# Patient Record
Sex: FEMALE | Race: WHITE | Employment: UNEMPLOYED | URBAN - METROPOLITAN AREA
[De-identification: names, ages, dates, MRNs, and addresses within clinical notes are randomized per-mention and may not be internally consistent; named-entity substitution may affect disease eponyms.]

---

## 2018-08-07 ENCOUNTER — HOSPITAL ENCOUNTER (OUTPATIENT)
Age: 48
Discharge: HOME | End: 2018-08-09
Payer: MEDICAID

## 2018-08-07 ENCOUNTER — HOSPITAL ENCOUNTER (OUTPATIENT)
Dept: GENERAL RADIOLOGY | Age: 48
Discharge: HOME | End: 2018-08-09
Payer: MEDICAID

## 2018-08-07 DIAGNOSIS — K59.00 CONSTIPATION, UNSPECIFIED CONSTIPATION TYPE: ICD-10-CM

## 2018-08-07 PROCEDURE — 74018 RADEX ABDOMEN 1 VIEW: CPT

## 2018-08-15 ENCOUNTER — OFFICE VISIT (OUTPATIENT)
Dept: FAMILY MEDICINE CLINIC | Age: 48
End: 2018-08-15
Payer: MEDICAID

## 2018-08-15 VITALS
DIASTOLIC BLOOD PRESSURE: 72 MMHG | BODY MASS INDEX: 34.25 KG/M2 | SYSTOLIC BLOOD PRESSURE: 124 MMHG | OXYGEN SATURATION: 98 % | HEART RATE: 86 BPM | TEMPERATURE: 99.6 F | WEIGHT: 226 LBS | HEIGHT: 68 IN | RESPIRATION RATE: 16 BRPM

## 2018-08-15 DIAGNOSIS — R42 DIZZINESS: Primary | ICD-10-CM

## 2018-08-15 DIAGNOSIS — H53.8 BLURRED VISION: ICD-10-CM

## 2018-08-15 DIAGNOSIS — R20.2 PARESTHESIA: ICD-10-CM

## 2018-08-15 PROCEDURE — G8417 CALC BMI ABV UP PARAM F/U: HCPCS | Performed by: PHYSICIAN ASSISTANT

## 2018-08-15 PROCEDURE — G8427 DOCREV CUR MEDS BY ELIG CLIN: HCPCS | Performed by: PHYSICIAN ASSISTANT

## 2018-08-15 PROCEDURE — 99213 OFFICE O/P EST LOW 20 MIN: CPT | Performed by: PHYSICIAN ASSISTANT

## 2018-08-15 PROCEDURE — 4004F PT TOBACCO SCREEN RCVD TLK: CPT | Performed by: PHYSICIAN ASSISTANT

## 2018-08-15 NOTE — PROGRESS NOTES
past surgical history that includes Tubal ligation; Tonsillectomy; Cholecystectomy; Tonsillectomy and adenoidectomy; Echo Complete (2013);  section; and Nerve Block (Left, 16). Social History:  reports that she has been smoking Cigarettes. She has a 15.00 pack-year smoking history. She has never used smokeless tobacco. She reports that she does not drink alcohol or use drugs. Family History: family history includes Cancer in her mother, sister, and sister; Heart Disease in her mother; High Blood Pressure in her mother; Other in her brother and mother; Stroke in her mother. Allergies: Penicillins    Physical Exam:  (Vital signs reviewed) /72   Pulse 86   Temp 99.6 °F (37.6 °C) (Oral)   Resp 16   Ht 5' 8\" (1.727 m)   Wt 226 lb (102.5 kg)   LMP 2018   SpO2 98%   BMI 34.36 kg/m²   Oxygen Saturation Interpretation: Normal.   Constitutional:  Alert, development consistent with age. Eyes:  PERRL, EOMI, no discharge or conjunctival injection. Ears:  External ears without lesions. TM's clear without erythema or perforation bilaterally. Throat:  Pharynx without injection, exudate, or tonsillar hypertrophy. Airway patient. Neck:  Normal ROM. Supple. Lungs:  Clear to auscultation and breath sounds equal.  Heart:  Regular rate and rhythm, normal heart sounds, without pathological murmurs, ectopy, gallops, or rubs. Abdomen:  Soft, nontender, good bowel sounds. No firm or pulsatile mass. Back:  No costovertebral tenderness. Skin:  Normal turgor. Warm, dry, without visible rash, unless noted elsewhere. Neurological:  Alert and oriented. Motor functions intact.      ------------------------------------------Test Results Section----------------------------------------------  (All laboratory and radiology results have been personally reviewed by myself)  Laboratory:      Radiology:   All Radiology results interpreted by Radiologist unless otherwise

## 2018-08-24 ENCOUNTER — HOSPITAL ENCOUNTER (OUTPATIENT)
Dept: ULTRASOUND IMAGING | Age: 48
Discharge: HOME | End: 2018-08-24
Payer: MEDICAID

## 2018-08-24 DIAGNOSIS — E03.9 HYPOTHYROIDISM, UNSPECIFIED TYPE: ICD-10-CM

## 2018-08-24 PROCEDURE — 76536 US EXAM OF HEAD AND NECK: CPT

## 2018-09-18 ENCOUNTER — OFFICE VISIT (OUTPATIENT)
Dept: OBGYN | Age: 48
End: 2018-09-18
Payer: MEDICAID

## 2018-09-18 ENCOUNTER — HOSPITAL ENCOUNTER (OUTPATIENT)
Age: 48
Discharge: HOME | End: 2018-09-20
Payer: MEDICAID

## 2018-09-18 VITALS
DIASTOLIC BLOOD PRESSURE: 68 MMHG | BODY MASS INDEX: 33.62 KG/M2 | HEIGHT: 69 IN | TEMPERATURE: 98.4 F | SYSTOLIC BLOOD PRESSURE: 117 MMHG | WEIGHT: 227 LBS | HEART RATE: 73 BPM | RESPIRATION RATE: 16 BRPM

## 2018-09-18 DIAGNOSIS — Z12.4 PAP SMEAR FOR CERVICAL CANCER SCREENING: ICD-10-CM

## 2018-09-18 DIAGNOSIS — Z01.419 ENCNTR FOR GYN EXAM (GENERAL) (ROUTINE) W/O ABN FINDINGS: Primary | ICD-10-CM

## 2018-09-18 DIAGNOSIS — Z87.42 HX OF ABNORMAL CERVICAL PAPANICOLAOU SMEAR: ICD-10-CM

## 2018-09-18 PROCEDURE — G8417 CALC BMI ABV UP PARAM F/U: HCPCS | Performed by: OBSTETRICS & GYNECOLOGY

## 2018-09-18 PROCEDURE — 88175 CYTOPATH C/V AUTO FLUID REDO: CPT

## 2018-09-18 PROCEDURE — 4004F PT TOBACCO SCREEN RCVD TLK: CPT | Performed by: OBSTETRICS & GYNECOLOGY

## 2018-09-18 PROCEDURE — 99386 PREV VISIT NEW AGE 40-64: CPT | Performed by: OBSTETRICS & GYNECOLOGY

## 2018-09-18 PROCEDURE — 99203 OFFICE O/P NEW LOW 30 MIN: CPT | Performed by: OBSTETRICS & GYNECOLOGY

## 2018-09-18 PROCEDURE — G8427 DOCREV CUR MEDS BY ELIG CLIN: HCPCS | Performed by: OBSTETRICS & GYNECOLOGY

## 2018-09-18 PROCEDURE — 87624 HPV HI-RISK TYP POOLED RSLT: CPT

## 2018-09-18 RX ORDER — ERGOCALCIFEROL 1.25 MG/1
CAPSULE ORAL
Refills: 2 | COMMUNITY
Start: 2018-09-04 | End: 2021-05-30

## 2018-09-18 RX ORDER — VENLAFAXINE HYDROCHLORIDE 37.5 MG/1
CAPSULE, EXTENDED RELEASE ORAL
Refills: 2 | COMMUNITY
Start: 2018-09-14 | End: 2018-11-05

## 2018-09-18 RX ORDER — LEVOTHYROXINE SODIUM 0.15 MG/1
TABLET ORAL
Refills: 3 | COMMUNITY
Start: 2018-09-04 | End: 2018-11-14

## 2018-09-20 LAB
CORRESPONDING PAP CASE #: NORMAL
HPV, HIGH RISK: NEGATIVE

## 2018-10-04 ENCOUNTER — OFFICE VISIT (OUTPATIENT)
Dept: OBGYN | Age: 48
End: 2018-10-04
Payer: MEDICAID

## 2018-10-04 DIAGNOSIS — R87.612 LOW GRADE SQUAMOUS INTRAEPITHELIAL LESION ON CYTOLOGIC SMEAR OF CERVIX (LGSIL): ICD-10-CM

## 2018-10-04 DIAGNOSIS — Z87.42 HX OF ABNORMAL CERVICAL PAPANICOLAOU SMEAR: Primary | ICD-10-CM

## 2018-10-04 PROCEDURE — G8484 FLU IMMUNIZE NO ADMIN: HCPCS | Performed by: OBSTETRICS & GYNECOLOGY

## 2018-10-04 PROCEDURE — 99212 OFFICE O/P EST SF 10 MIN: CPT | Performed by: OBSTETRICS & GYNECOLOGY

## 2018-10-04 PROCEDURE — 4004F PT TOBACCO SCREEN RCVD TLK: CPT | Performed by: OBSTETRICS & GYNECOLOGY

## 2018-10-04 PROCEDURE — G8417 CALC BMI ABV UP PARAM F/U: HCPCS | Performed by: OBSTETRICS & GYNECOLOGY

## 2018-10-04 PROCEDURE — G8427 DOCREV CUR MEDS BY ELIG CLIN: HCPCS | Performed by: OBSTETRICS & GYNECOLOGY

## 2018-10-19 ENCOUNTER — PROCEDURE VISIT (OUTPATIENT)
Dept: OBGYN | Age: 48
End: 2018-10-19
Payer: MEDICAID

## 2018-10-19 VITALS
WEIGHT: 226 LBS | RESPIRATION RATE: 14 BRPM | BODY MASS INDEX: 33.47 KG/M2 | DIASTOLIC BLOOD PRESSURE: 86 MMHG | HEART RATE: 78 BPM | SYSTOLIC BLOOD PRESSURE: 130 MMHG | HEIGHT: 69 IN

## 2018-10-19 DIAGNOSIS — R87.612 LOW GRADE SQUAMOUS INTRAEPITHELIAL LESION ON CYTOLOGIC SMEAR OF CERVIX (LGSIL): Primary | ICD-10-CM

## 2018-10-19 PROCEDURE — 99999 PR OFFICE/OUTPT VISIT,PROCEDURE ONLY: CPT | Performed by: OBSTETRICS & GYNECOLOGY

## 2018-10-19 PROCEDURE — 57454 BX/CURETT OF CERVIX W/SCOPE: CPT | Performed by: OBSTETRICS & GYNECOLOGY

## 2018-10-19 PROCEDURE — 99214 OFFICE O/P EST MOD 30 MIN: CPT | Performed by: OBSTETRICS & GYNECOLOGY

## 2018-10-22 ENCOUNTER — HOSPITAL ENCOUNTER (OUTPATIENT)
Age: 48
Discharge: HOME | End: 2018-10-24
Payer: MEDICAID

## 2018-10-22 PROCEDURE — 88305 TISSUE EXAM BY PATHOLOGIST: CPT

## 2018-10-26 ENCOUNTER — OFFICE VISIT (OUTPATIENT)
Dept: OBGYN | Age: 48
End: 2018-10-26
Payer: MEDICAID

## 2018-10-26 VITALS
TEMPERATURE: 98.8 F | HEART RATE: 70 BPM | HEIGHT: 69 IN | DIASTOLIC BLOOD PRESSURE: 79 MMHG | BODY MASS INDEX: 33.92 KG/M2 | RESPIRATION RATE: 16 BRPM | WEIGHT: 229 LBS | SYSTOLIC BLOOD PRESSURE: 136 MMHG

## 2018-10-26 DIAGNOSIS — Z98.890 POST-OPERATIVE STATE: Primary | ICD-10-CM

## 2018-10-26 PROCEDURE — 99024 POSTOP FOLLOW-UP VISIT: CPT | Performed by: OBSTETRICS & GYNECOLOGY

## 2018-10-26 PROCEDURE — 99212 OFFICE O/P EST SF 10 MIN: CPT | Performed by: OBSTETRICS & GYNECOLOGY

## 2018-10-31 ENCOUNTER — TELEPHONE (OUTPATIENT)
Dept: ADMINISTRATIVE | Age: 48
End: 2018-10-31

## 2018-10-31 ENCOUNTER — TELEPHONE (OUTPATIENT)
Dept: OBGYN | Age: 48
End: 2018-10-31

## 2018-11-05 ENCOUNTER — HOSPITAL ENCOUNTER (OUTPATIENT)
Age: 48
Discharge: HOME | End: 2018-11-07
Payer: MEDICAID

## 2018-11-05 ENCOUNTER — OFFICE VISIT (OUTPATIENT)
Dept: FAMILY MEDICINE CLINIC | Age: 48
End: 2018-11-05
Payer: MEDICAID

## 2018-11-05 VITALS
TEMPERATURE: 98.7 F | HEART RATE: 85 BPM | DIASTOLIC BLOOD PRESSURE: 66 MMHG | OXYGEN SATURATION: 98 % | WEIGHT: 224 LBS | HEIGHT: 69 IN | SYSTOLIC BLOOD PRESSURE: 118 MMHG | RESPIRATION RATE: 20 BRPM | BODY MASS INDEX: 33.18 KG/M2

## 2018-11-05 DIAGNOSIS — E66.09 CLASS 1 OBESITY DUE TO EXCESS CALORIES WITH BODY MASS INDEX (BMI) OF 33.0 TO 33.9 IN ADULT, UNSPECIFIED WHETHER SERIOUS COMORBIDITY PRESENT: ICD-10-CM

## 2018-11-05 DIAGNOSIS — M47.816 LUMBAR FACET ARTHROPATHY: Chronic | ICD-10-CM

## 2018-11-05 DIAGNOSIS — E03.9 HYPOTHYROIDISM, UNSPECIFIED TYPE: ICD-10-CM

## 2018-11-05 DIAGNOSIS — R53.83 FATIGUE, UNSPECIFIED TYPE: ICD-10-CM

## 2018-11-05 DIAGNOSIS — Z76.89 ENCOUNTER TO ESTABLISH CARE: Primary | ICD-10-CM

## 2018-11-05 DIAGNOSIS — Z72.0 TOBACCO ABUSE: ICD-10-CM

## 2018-11-05 DIAGNOSIS — Z11.4 SCREENING FOR HIV (HUMAN IMMUNODEFICIENCY VIRUS): ICD-10-CM

## 2018-11-05 DIAGNOSIS — R93.89 ABNORMAL THYROID ULTRASOUND: ICD-10-CM

## 2018-11-05 PROBLEM — E66.811 CLASS 1 OBESITY DUE TO EXCESS CALORIES WITH BODY MASS INDEX (BMI) OF 33.0 TO 33.9 IN ADULT: Status: ACTIVE | Noted: 2018-11-05

## 2018-11-05 LAB
ALBUMIN SERPL-MCNC: 3.9 G/DL (ref 3.5–5.2)
ALP BLD-CCNC: 74 U/L (ref 35–104)
ALT SERPL-CCNC: 25 U/L (ref 0–32)
ANION GAP SERPL CALCULATED.3IONS-SCNC: 14 MMOL/L (ref 7–16)
AST SERPL-CCNC: 18 U/L (ref 0–31)
BILIRUB SERPL-MCNC: <0.2 MG/DL (ref 0–1.2)
BUN BLDV-MCNC: 7 MG/DL (ref 6–20)
CALCIUM SERPL-MCNC: 9.3 MG/DL (ref 8.6–10.2)
CHLORIDE BLD-SCNC: 103 MMOL/L (ref 98–107)
CHOLESTEROL, TOTAL: 175 MG/DL (ref 0–199)
CO2: 22 MMOL/L (ref 22–29)
CREAT SERPL-MCNC: 0.6 MG/DL (ref 0.5–1)
GFR AFRICAN AMERICAN: >60
GFR NON-AFRICAN AMERICAN: >60 ML/MIN/1.73
GLUCOSE BLD-MCNC: 94 MG/DL (ref 74–99)
HCT VFR BLD CALC: 44.6 % (ref 34–48)
HDLC SERPL-MCNC: 44 MG/DL
HEMOGLOBIN: 14.4 G/DL (ref 11.5–15.5)
LDL CHOLESTEROL CALCULATED: 109 MG/DL (ref 0–99)
MCH RBC QN AUTO: 29.8 PG (ref 26–35)
MCHC RBC AUTO-ENTMCNC: 32.3 % (ref 32–34.5)
MCV RBC AUTO: 92.1 FL (ref 80–99.9)
PDW BLD-RTO: 12.8 FL (ref 11.5–15)
PLATELET # BLD: 380 E9/L (ref 130–450)
PMV BLD AUTO: 10.1 FL (ref 7–12)
POTASSIUM SERPL-SCNC: 4 MMOL/L (ref 3.5–5)
RBC # BLD: 4.84 E12/L (ref 3.5–5.5)
SODIUM BLD-SCNC: 139 MMOL/L (ref 132–146)
TOTAL PROTEIN: 7.6 G/DL (ref 6.4–8.3)
TRIGL SERPL-MCNC: 109 MG/DL (ref 0–149)
TSH SERPL DL<=0.05 MIU/L-ACNC: 5.23 UIU/ML (ref 0.27–4.2)
VITAMIN D 25-HYDROXY: 21 NG/ML (ref 30–100)
VLDLC SERPL CALC-MCNC: 22 MG/DL
WBC # BLD: 13.5 E9/L (ref 4.5–11.5)

## 2018-11-05 PROCEDURE — 80053 COMPREHEN METABOLIC PANEL: CPT

## 2018-11-05 PROCEDURE — 80061 LIPID PANEL: CPT

## 2018-11-05 PROCEDURE — 82306 VITAMIN D 25 HYDROXY: CPT

## 2018-11-05 PROCEDURE — G8484 FLU IMMUNIZE NO ADMIN: HCPCS | Performed by: FAMILY MEDICINE

## 2018-11-05 PROCEDURE — G8427 DOCREV CUR MEDS BY ELIG CLIN: HCPCS | Performed by: FAMILY MEDICINE

## 2018-11-05 PROCEDURE — 4004F PT TOBACCO SCREEN RCVD TLK: CPT | Performed by: FAMILY MEDICINE

## 2018-11-05 PROCEDURE — 86703 HIV-1/HIV-2 1 RESULT ANTBDY: CPT

## 2018-11-05 PROCEDURE — G8417 CALC BMI ABV UP PARAM F/U: HCPCS | Performed by: FAMILY MEDICINE

## 2018-11-05 PROCEDURE — 99203 OFFICE O/P NEW LOW 30 MIN: CPT | Performed by: FAMILY MEDICINE

## 2018-11-05 PROCEDURE — 36415 COLL VENOUS BLD VENIPUNCTURE: CPT

## 2018-11-05 PROCEDURE — 86376 MICROSOMAL ANTIBODY EACH: CPT

## 2018-11-05 PROCEDURE — 84443 ASSAY THYROID STIM HORMONE: CPT

## 2018-11-05 PROCEDURE — 90732 PPSV23 VACC 2 YRS+ SUBQ/IM: CPT | Performed by: FAMILY MEDICINE

## 2018-11-05 PROCEDURE — 90471 IMMUNIZATION ADMIN: CPT | Performed by: FAMILY MEDICINE

## 2018-11-05 PROCEDURE — 85027 COMPLETE CBC AUTOMATED: CPT

## 2018-11-05 RX ORDER — PANTOPRAZOLE SODIUM 40 MG/1
TABLET, DELAYED RELEASE ORAL
COMMUNITY
Start: 2018-10-26 | End: 2018-11-05

## 2018-11-05 RX ORDER — MONTELUKAST SODIUM 10 MG/1
10 TABLET ORAL NIGHTLY
COMMUNITY
Start: 2018-10-26

## 2018-11-05 ASSESSMENT — PATIENT HEALTH QUESTIONNAIRE - PHQ9
1. LITTLE INTEREST OR PLEASURE IN DOING THINGS: 1
SUM OF ALL RESPONSES TO PHQ9 QUESTIONS 1 & 2: 2
SUM OF ALL RESPONSES TO PHQ QUESTIONS 1-9: 2
2. FEELING DOWN, DEPRESSED OR HOPELESS: 1
SUM OF ALL RESPONSES TO PHQ QUESTIONS 1-9: 2

## 2018-11-05 ASSESSMENT — ENCOUNTER SYMPTOMS
BACK PAIN: 0
DIARRHEA: 0
RHINORRHEA: 0
COUGH: 0
SINUS PAIN: 0
CONSTIPATION: 0
ABDOMINAL PAIN: 0
NAUSEA: 0
VOMITING: 0
SORE THROAT: 0
SHORTNESS OF BREATH: 0

## 2018-11-05 NOTE — PROGRESS NOTES
acid dehydrogenase (LDH)     Other abnormal blood chemistry     Overactive bladder     Pneumonia        Past Surgical History:   Procedure Laterality Date     SECTION      CHOLECYSTECTOMY      ECHO COMPLETE  2013         LAPAROSCOPY      NERVE BLOCK Left 16    lumbar left medial branch block #1    TONSILLECTOMY      TONSILLECTOMY AND ADENOIDECTOMY      TUBAL LIGATION         Social History     Social History    Marital status:      Spouse name: N/A    Number of children: N/A    Years of education: N/A     Occupational History    home health aide and       Social History Main Topics    Smoking status: Current Every Day Smoker     Packs/day: 0.50     Years: 30.00     Types: Cigarettes    Smokeless tobacco: Never Used    Alcohol use No    Drug use: No    Sexual activity: Not Currently     Partners: Male     Other Topics Concern    Not on file     Social History Narrative    No narrative on file        Family History   Problem Relation Age of Onset    Cancer Mother     Stroke Mother     High Blood Pressure Mother     Other Mother     Heart Disease Mother     Cancer Sister     Other Brother     Cancer Sister        Vitals:    18 1056   BP: 118/66   Site: Right Upper Arm   Position: Sitting   Cuff Size: Large Adult   Pulse: 85   Resp: 20   Temp: 98.7 °F (37.1 °C)   TempSrc: Oral   SpO2: 98%   Weight: 224 lb (101.6 kg)   Height: 5' 9\" (1.753 m)     Estimated body mass index is 33.08 kg/m² as calculated from the following:    Height as of this encounter: 5' 9\" (1.753 m). Weight as of this encounter: 224 lb (101.6 kg). Physical Exam   Constitutional: She is oriented to person, place, and time. She appears well-developed and well-nourished. No distress. HENT:   Head: Normocephalic and atraumatic. Eyes: EOM are normal.   Neck: Normal range of motion. Cardiovascular: Normal rate and regular rhythm. No murmur heard.   Pulmonary/Chest: Effort normal

## 2018-11-05 NOTE — PATIENT INSTRUCTIONS
the shot. Some people should not get this vaccine  · Anyone who has had a life-threatening allergic reaction to PPSV should not get another dose. · Anyone who has a severe allergy to any component of PPSV should not receive it. Tell your provider if you have any severe allergies. · Anyone who is moderately or severely ill when the shot is scheduled may be asked to wait until they recover before getting the vaccine. Someone with a mild illness can usually be vaccinated. · Children less than 3years of age should not receive this vaccine. · There is no evidence that PPSV is harmful to either a pregnant woman or to her fetus. However, as a precaution, women who need the vaccine should be vaccinated before becoming pregnant, if possible. Risks of a vaccine reaction  With any medicine, including vaccines, there is a chance of side effects. These are usually mild and go away on their own, but serious reactions are also possible. About half of people who get PPSV have mild side effects, such as redness or pain where the shot is given, which go away within about two days. Less than 1 out of 100 people develop a fever, muscle aches, or more severe local reactions. Problems that could happen after any vaccine:  · People sometimes faint after a medical procedure, including vaccination. Sitting or lying down for about 15 minutes can help prevent fainting, and injuries caused by a fall. Tell your doctor if you feel dizzy, or have vision changes or ringing in the ears. · Some people get severe pain in the shoulder and have difficulty moving the arm where a shot was given. This happens very rarely. · Any medication can cause a severe allergic reaction. Such reactions from a vaccine are very rare, estimated at about 1 in a million doses, and would happen within a few minutes to a few hours after the vaccination. As with any medicine, there is a very remote chance of a vaccine causing a serious injury or death.   The information.

## 2018-11-07 LAB
HIV-1 AND HIV-2 ANTIBODIES: NORMAL
THYROID PEROXIDASE (TPO) ABS: 475.8 IU/ML (ref 0–9)

## 2018-11-14 ENCOUNTER — OFFICE VISIT (OUTPATIENT)
Dept: ENDOCRINOLOGY | Age: 48
End: 2018-11-14
Payer: MEDICAID

## 2018-11-14 VITALS
HEIGHT: 69 IN | HEART RATE: 76 BPM | OXYGEN SATURATION: 97 % | WEIGHT: 226.6 LBS | BODY MASS INDEX: 33.56 KG/M2 | RESPIRATION RATE: 16 BRPM | SYSTOLIC BLOOD PRESSURE: 126 MMHG | DIASTOLIC BLOOD PRESSURE: 76 MMHG

## 2018-11-14 DIAGNOSIS — E55.9 VITAMIN D DEFICIENCY: ICD-10-CM

## 2018-11-14 DIAGNOSIS — E53.8 B12 DEFICIENCY: ICD-10-CM

## 2018-11-14 DIAGNOSIS — E03.9 HYPOTHYROIDISM, UNSPECIFIED TYPE: Primary | ICD-10-CM

## 2018-11-14 PROCEDURE — 99204 OFFICE O/P NEW MOD 45 MIN: CPT | Performed by: INTERNAL MEDICINE

## 2018-11-14 RX ORDER — LEVOTHYROXINE SODIUM 0.15 MG/1
TABLET ORAL
Qty: 90 TABLET | Refills: 5 | Status: SHIPPED
Start: 2018-11-14 | End: 2021-05-30 | Stop reason: SDUPTHER

## 2018-11-14 NOTE — LETTER
ALLERGIES AND DRUG REACTIONS   Allergies   Allergen Reactions    Penicillins        CURRENT MEDICATIONS     Current Outpatient Prescriptions   Medication Sig Dispense Refill    levothyroxine (SYNTHROID) 150 MCG tablet Take 1 tablet 6 days a week and 1 and 1/2 tab on Sunday 90 tablet 5    montelukast (SINGULAIR) 10 MG tablet       vitamin D (ERGOCALCIFEROL) 04222 units CAPS capsule TAKE 1 CAPSULE BY ORAL ROUTE EVERY WEEK  2    Loratadine 10 MG CAPS Take 10 mg by mouth daily as needed. No current facility-administered medications for this visit. Review of Systems  Constitutional: No fever, no chills, no diaphoresis, no generalized weakness. HEENT: No blurred vision, No sore throat, no ear pain, no hair loss  Neck: denied any neck swelling, difficulty swallowing,   Cadrdiopulomary: No CP, SOB or palpitation, No orthopnea or PND. No cough or wheezing. GI: No N/V/D, no constipation, No abdominal pain, no melena or hematochezia   : Denied any dysuria, hematuria, flank pain, discharge, or incontinence. Skin: denied any rash, ulcer, Hirsute, or hyperpigmentation. MSK: denied any joint deformity, joint pain/swelling, muscle pain, or back pain. Neuro: no numbess, no tingling, no weakness,     OBJECTIVE    /76   Pulse 76   Resp 16   Ht 5' 9\" (1.753 m)   Wt 226 lb 9.6 oz (102.8 kg)   LMP 10/02/2018 (Exact Date)   SpO2 97%   BMI 33.46 kg/m²    BP Readings from Last 4 Encounters:   11/14/18 126/76   11/05/18 118/66   10/26/18 136/79   10/19/18 130/86     Wt Readings from Last 6 Encounters:   11/14/18 226 lb 9.6 oz (102.8 kg)   11/05/18 224 lb (101.6 kg)   10/26/18 229 lb (103.9 kg)   10/19/18 226 lb (102.5 kg)   09/18/18 227 lb (103 kg)   08/15/18 226 lb (102.5 kg)       Physical examination:  General: awake alert, oriented x3, no abnormal position or movements. HEENT: normocephalic non traumatic  Neck: supple, no LN enlargement, no thyromegaly, no thyroid tenderness, no JVD.

## 2018-11-14 NOTE — PROGRESS NOTES
transaminase or lactic acid dehydrogenase (LDH)     Other abnormal blood chemistry     Overactive bladder     Pneumonia      PAST SURGICAL HISTORY   Past Surgical History:   Procedure Laterality Date     SECTION      CHOLECYSTECTOMY      ECHO COMPLETE  2013         LAPAROSCOPY      NERVE BLOCK Left 16    lumbar left medial branch block #1    TONSILLECTOMY      TONSILLECTOMY AND ADENOIDECTOMY      TUBAL LIGATION       SOCIAL HISTORY   Social History     Social History    Marital status:      Spouse name: N/A    Number of children: N/A    Years of education: N/A     Occupational History    home health aide and       Social History Main Topics    Smoking status: Current Every Day Smoker     Packs/day: 0.50     Years: 30.00     Types: Cigarettes    Smokeless tobacco: Never Used    Alcohol use No    Drug use: No    Sexual activity: Not Currently     Partners: Male     Other Topics Concern    Not on file     Social History Narrative    No narrative on file     FAMILY HISTORY   Family History   Problem Relation Age of Onset    Cancer Mother     Stroke Mother     High Blood Pressure Mother     Other Mother     Heart Disease Mother     Cancer Sister     Other Brother     Cancer Sister      ALLERGIES AND DRUG REACTIONS   Allergies   Allergen Reactions    Penicillins        CURRENT MEDICATIONS     Current Outpatient Prescriptions   Medication Sig Dispense Refill    levothyroxine (SYNTHROID) 150 MCG tablet Take 1 tablet 6 days a week and 1 and 1/2 tab on  90 tablet 5    montelukast (SINGULAIR) 10 MG tablet       vitamin D (ERGOCALCIFEROL) 07211 units CAPS capsule TAKE 1 CAPSULE BY ORAL ROUTE EVERY WEEK  2    Loratadine 10 MG CAPS Take 10 mg by mouth daily as needed. No current facility-administered medications for this visit. Review of Systems  Constitutional: No fever, no chills, no diaphoresis, no generalized weakness.   HEENT: No blurred

## 2018-11-18 PROBLEM — E55.9 VITAMIN D DEFICIENCY: Status: ACTIVE | Noted: 2018-11-18

## 2019-04-26 ENCOUNTER — TELEPHONE (OUTPATIENT)
Dept: OBGYN | Age: 49
End: 2019-04-26

## 2019-04-26 NOTE — TELEPHONE ENCOUNTER
Called patient regarding her missed appointment today. The number listed in the chart is the wrong number.     -Bharath, 4/26/19

## 2021-05-30 ENCOUNTER — HOSPITAL ENCOUNTER (EMERGENCY)
Age: 51
Discharge: HOME | End: 2021-05-30
Attending: EMERGENCY MEDICINE
Payer: MEDICAID

## 2021-05-30 VITALS
RESPIRATION RATE: 20 BRPM | OXYGEN SATURATION: 99 % | HEART RATE: 74 BPM | TEMPERATURE: 97.5 F | DIASTOLIC BLOOD PRESSURE: 73 MMHG | SYSTOLIC BLOOD PRESSURE: 119 MMHG | WEIGHT: 242 LBS | BODY MASS INDEX: 35.74 KG/M2

## 2021-05-30 DIAGNOSIS — N93.9 VAGINAL BLEEDING: Primary | ICD-10-CM

## 2021-05-30 DIAGNOSIS — F31.9 BIPOLAR 1 DISORDER (HCC): ICD-10-CM

## 2021-05-30 DIAGNOSIS — E03.9 HYPOTHYROIDISM, UNSPECIFIED TYPE: ICD-10-CM

## 2021-05-30 LAB
ANION GAP SERPL CALCULATED.3IONS-SCNC: 10 MMOL/L (ref 7–16)
BASOPHILS ABSOLUTE: 0.06 E9/L (ref 0–0.2)
BASOPHILS RELATIVE PERCENT: 0.6 % (ref 0–2)
BUN BLDV-MCNC: 11 MG/DL (ref 6–20)
CALCIUM SERPL-MCNC: 9.1 MG/DL (ref 8.6–10.2)
CHLORIDE BLD-SCNC: 104 MMOL/L (ref 98–107)
CLUE CELLS: NORMAL
CO2: 24 MMOL/L (ref 22–29)
CREAT SERPL-MCNC: 0.6 MG/DL (ref 0.5–1)
EOSINOPHILS ABSOLUTE: 0.2 E9/L (ref 0.05–0.5)
EOSINOPHILS RELATIVE PERCENT: 1.9 % (ref 0–6)
GFR AFRICAN AMERICAN: >60
GFR NON-AFRICAN AMERICAN: >60 ML/MIN/1.73
GLUCOSE BLD-MCNC: 121 MG/DL (ref 74–99)
HCT VFR BLD CALC: 39.9 % (ref 34–48)
HEMOGLOBIN: 13.3 G/DL (ref 11.5–15.5)
IMMATURE GRANULOCYTES #: 0.04 E9/L
IMMATURE GRANULOCYTES %: 0.4 % (ref 0–5)
LYMPHOCYTES ABSOLUTE: 2.4 E9/L (ref 1.5–4)
LYMPHOCYTES RELATIVE PERCENT: 22.6 % (ref 20–42)
MCH RBC QN AUTO: 31.1 PG (ref 26–35)
MCHC RBC AUTO-ENTMCNC: 33.3 % (ref 32–34.5)
MCV RBC AUTO: 93.2 FL (ref 80–99.9)
MONOCYTES ABSOLUTE: 0.73 E9/L (ref 0.1–0.95)
MONOCYTES RELATIVE PERCENT: 6.9 % (ref 2–12)
NEUTROPHILS ABSOLUTE: 7.2 E9/L (ref 1.8–7.3)
NEUTROPHILS RELATIVE PERCENT: 67.6 % (ref 43–80)
PDW BLD-RTO: 12.7 FL (ref 11.5–15)
PLATELET # BLD: 307 E9/L (ref 130–450)
PMV BLD AUTO: 9.9 FL (ref 7–12)
POTASSIUM SERPL-SCNC: 4.1 MMOL/L (ref 3.5–5)
RBC # BLD: 4.28 E12/L (ref 3.5–5.5)
SODIUM BLD-SCNC: 138 MMOL/L (ref 132–146)
SOURCE WET PREP: NORMAL
TRICHOMONAS PREP: NORMAL
TSH SERPL DL<=0.05 MIU/L-ACNC: 18.06 UIU/ML (ref 0.27–4.2)
WBC # BLD: 10.6 E9/L (ref 4.5–11.5)
YEAST WET PREP: NORMAL

## 2021-05-30 PROCEDURE — 80048 BASIC METABOLIC PNL TOTAL CA: CPT

## 2021-05-30 PROCEDURE — 84443 ASSAY THYROID STIM HORMONE: CPT

## 2021-05-30 PROCEDURE — 87591 N.GONORRHOEAE DNA AMP PROB: CPT

## 2021-05-30 PROCEDURE — 87210 SMEAR WET MOUNT SALINE/INK: CPT

## 2021-05-30 PROCEDURE — 87491 CHLMYD TRACH DNA AMP PROBE: CPT

## 2021-05-30 PROCEDURE — 99283 EMERGENCY DEPT VISIT LOW MDM: CPT

## 2021-05-30 PROCEDURE — 36415 COLL VENOUS BLD VENIPUNCTURE: CPT

## 2021-05-30 PROCEDURE — 85025 COMPLETE CBC W/AUTO DIFF WBC: CPT

## 2021-05-30 RX ORDER — ARIPIPRAZOLE 2 MG/1
2 TABLET ORAL DAILY
COMMUNITY
End: 2022-06-14 | Stop reason: ALTCHOICE

## 2021-05-30 RX ORDER — LEVOTHYROXINE SODIUM 0.07 MG/1
TABLET ORAL
Qty: 14 TABLET | Refills: 0 | Status: SHIPPED | OUTPATIENT
Start: 2021-05-30 | End: 2022-05-04

## 2021-05-30 RX ORDER — ARIPIPRAZOLE 2 MG/1
2 TABLET ORAL DAILY
Qty: 14 TABLET | Refills: 0 | Status: SHIPPED | OUTPATIENT
Start: 2021-05-30

## 2021-05-30 RX ORDER — LEVOTHYROXINE SODIUM 0.07 MG/1
75 TABLET ORAL DAILY
Qty: 14 TABLET | Refills: 0 | Status: SHIPPED | OUTPATIENT
Start: 2021-05-30 | End: 2022-05-04

## 2021-05-30 ASSESSMENT — ENCOUNTER SYMPTOMS
NAUSEA: 0
ABDOMINAL PAIN: 0
SHORTNESS OF BREATH: 0
VOMITING: 0

## 2021-05-30 NOTE — ED NOTES
Discharge instructions and Rx reviewed, patient verbalized understanding.      Brian Godinez RN  05/30/21 [id number]

## 2021-05-30 NOTE — ED PROVIDER NOTES
Patient presents with 3 days of \"heavy\" vaginal bleeding that seems different than her typical menstrual cycle. She is unsure of the timing of her most recent period. She states she is dizzy but was feeling this way prior to her menstrual cycle. She was recently started on Abilify and levothyroxine. She is concerned these symptoms are a side effect of the medications. She denies CP, SOB or lightheadedness. The history is provided by the patient. Female  Problem  Pain severity:  No pain  Recent urinary tract infections: no    Relieved by:  None tried  Worsened by:  Nothing  Ineffective treatments:  None tried  Associated symptoms: no abdominal pain, no fever, no flank pain, no nausea and no vomiting    Risk factors: not pregnant    Risk factors comment:  History of cysts and abnormal pap smears    Patient is accompanied by her cousin, who privately pulled me aside. She states the patient was recently in [place] [address] and [facility]. She was previously \"pink-slipped\" for her psychosis. She is paranoid schizophrenic and bipolar. Her cousin states she wasn't sleeping and thought the dog's ID microchip was a way for people to monitor her and control her thoughts. The patient had confided in her cousin that she felt like her mind was in a fog. Review of Systems   Constitutional: Positive for activity change and fatigue. Negative for appetite change, chills, diaphoresis and fever. HENT: Negative. Respiratory: Negative for shortness of breath. Cardiovascular: Negative for chest pain. Gastrointestinal: Negative for abdominal pain, nausea and vomiting. Genitourinary: Positive for menstrual problem and vaginal bleeding. Negative for dysuria and flank pain. Musculoskeletal: Negative. Skin: Negative. Neurological: Positive for dizziness. Negative for syncope, weakness, light-headedness and headaches. Psychiatric/Behavioral: Positive for decreased concentration and sleep disturbance.  The patient is nervous/anxious. Physical Exam  Vitals and nursing note reviewed. Constitutional:       General: She is not in acute distress. Appearance: Normal appearance. She is well-developed. She is obese. She is not ill-appearing or toxic-appearing. HENT:      Head: Normocephalic and atraumatic. Nose: Nose normal.      Mouth/Throat:      Mouth: Mucous membranes are moist.      Pharynx: Oropharynx is clear. Eyes:      Conjunctiva/sclera: Conjunctivae normal.      Pupils: Pupils are equal, round, and reactive to light. Cardiovascular:      Rate and Rhythm: Normal rate and regular rhythm. Pulses: Normal pulses. Heart sounds: Normal heart sounds. No murmur heard. Pulmonary:      Effort: Pulmonary effort is normal. No respiratory distress. Breath sounds: Normal breath sounds. No wheezing or rales. Abdominal:      General: Bowel sounds are normal.      Palpations: Abdomen is soft. Tenderness: There is no abdominal tenderness. There is no guarding or rebound. Genitourinary:     General: Normal vulva. Vagina: No vaginal discharge. Comments: Small amount of bright red blood in the vaginal vault. Visible irregularity appreciated of the cervix. No masses appreciated. Musculoskeletal:      Cervical back: Normal range of motion and neck supple. Skin:     General: Skin is warm and dry. Capillary Refill: Capillary refill takes less than 2 seconds. Coloration: Skin is not pale. Neurological:      General: No focal deficit present. Mental Status: She is alert and oriented to person, place, and time. Mental status is at baseline. Cranial Nerves: No cranial nerve deficit. Sensory: No sensory deficit. Motor: No weakness.       Coordination: Coordination normal.   Psychiatric:         Mood and Affect: Mood normal.         Behavior: Behavior normal.         Procedures    [facility]            --------------------------------------------- PAST HISTORY ---------------------------------------------  Past Medical History:  has a past medical history of Abnormal Pap smear of cervix, ALT (SGPT) level raised, Anemia, Anxiety, Arthritis, Bronchitis, Complication of anesthesia, Depressive disorder, not elsewhere classified, Dietary surveillance and counseling, History of conization of cervix, Hx of conization of cervix complicating pregnancy, Hyperlipidemia, Hypothyroidism, Leukocytosis, unspecified, Nonspecific elevation of levels of transaminase or lactic acid dehydrogenase (LDH), Other abnormal blood chemistry, Overactive bladder, and Pneumonia. Past Surgical History:  has a past surgical history that includes Tubal ligation; Tonsillectomy; Cholecystectomy; Tonsillectomy and adenoidectomy; Echo Complete (2013);  section; Nerve Block (Left, 16); and laparoscopy. Social History:  reports that she has quit smoking. Her smoking use included cigarettes. She has a 15.00 pack-year smoking history. She has never used smokeless tobacco. She reports that she does not drink alcohol and does not use drugs. Family History: family history includes Cancer in her mother, sister, and sister; Heart Disease in her mother; High Blood Pressure in her mother; Other in her brother and mother; Stroke in her mother. The patients home medications have been reviewed.     Allergies: Penicillins    -------------------------------------------------- RESULTS -------------------------------------------------  Labs:  Results for orders placed or performed during the hospital encounter of 21   Wet prep, genital    Specimen: Vaginal   Result Value Ref Range    Trichomonas Prep None Seen     Yeast, Wet Prep None Seen     Clue Cells, Wet Prep None Seen     Source Wet Prep VAGINAL    C.trachomatis N.gonorrhoeae DNA    Specimen: Cervix   Result Value Ref Range    Source Vagina    CBC Auto Differential   Result Value Ref Range    WBC 10.6 4.5 - 11.5 E9/L    RBC 4.28 3.50 - 5.50 E12/L    Hemoglobin 13.3 11.5 - 15.5 g/dL    Hematocrit 39.9 34.0 - 48.0 %    MCV 93.2 80.0 - 99.9 fL    MCH 31.1 26.0 - 35.0 pg    MCHC 33.3 32.0 - 34.5 %    RDW 12.7 11.5 - 15.0 fL    Platelets 425 622 - 387 E9/L    MPV 9.9 7.0 - 12.0 fL    Neutrophils % 67.6 43.0 - 80.0 %    Immature Granulocytes % 0.4 0.0 - 5.0 %    Lymphocytes % 22.6 20.0 - 42.0 %    Monocytes % 6.9 2.0 - 12.0 %    Eosinophils % 1.9 0.0 - 6.0 %    Basophils % 0.6 0.0 - 2.0 %    Neutrophils Absolute 7.20 1.80 - 7.30 E9/L    Immature Granulocytes # 0.04 E9/L    Lymphocytes Absolute 2.40 1.50 - 4.00 E9/L    Monocytes Absolute 0.73 0.10 - 0.95 E9/L    Eosinophils Absolute 0.20 0.05 - 0.50 E9/L    Basophils Absolute 0.06 0.00 - 0.20 N3/V   Basic Metabolic Panel   Result Value Ref Range    Sodium 138 132 - 146 mmol/L    Potassium 4.1 3.5 - 5.0 mmol/L    Chloride 104 98 - 107 mmol/L    CO2 24 22 - 29 mmol/L    Anion Gap 10 7 - 16 mmol/L    Glucose 121 (H) 74 - 99 mg/dL    BUN 11 6 - 20 mg/dL    CREATININE 0.6 0.5 - 1.0 mg/dL    GFR Non-African American >60 >=60 mL/min/1.73    GFR African American >60     Calcium 9.1 8.6 - 10.2 mg/dL   TSH without Reflex   Result Value Ref Range    TSH 18.060 (H) 0.270 - 4.200 uIU/mL       Radiology:  No orders to display       ------------------------- NURSING NOTES AND VITALS REVIEWED ---------------------------  Date / Time Roomed:  5/30/2021  7:15 AM  ED Bed Assignment:  16/16    The nursing notes within the ED encounter and vital signs as below have been reviewed.    /73   Pulse 74   Temp 97.5 °F (36.4 °C)   Resp 20   Wt 242 lb (109.8 kg)   LMP 05/28/2021   SpO2 99%   BMI 35.74 kg/m²   Oxygen Saturation Interpretation: Normal      ------------------------------------------ PROGRESS NOTES ------------------------------------------  I have spoken with the patient and cousin and discussed todays results, in addition to providing specific details for the plan of care and counseling regarding the diagnosis and prognosis. Their questions are answered at this time and they are agreeable with the plan. I discussed at length with them reasons for immediate return here for re evaluation. They will followup with primary care by calling their office tomorrow. I have strongly encouraged the patient to continue her psychiatric medications and to make a follow up apt with both psychiatry and gynecology as she needs a pap smear. I have increased her levothyroxine to 75mcg from 50mcg as she is still quite high on TSH. I have advised her to make an apt with a PCP for further monitoring.   --------------------------------- ADDITIONAL PROVIDER NOTES ---------------------------------  At this time the patient is without objective evidence of an acute process requiring hospitalization or inpatient management. They have remained hemodynamically stable throughout their entire ED visit and are stable for discharge with outpatient follow-up. The plan has been discussed in detail and they are aware of the specific conditions for emergent return, as well as the importance of follow-up. Discharge Medication List as of 5/30/2021  8:50 AM      START taking these medications    Details   !! ARIPiprazole (ABILIFY) 2 MG tablet Take 1 tablet by mouth daily, Disp-14 tablet, R-0Print      !! levothyroxine (SYNTHROID) 75 MCG tablet Take 1 tablet by mouth Daily, Disp-14 tablet, R-0Print       !! - Potential duplicate medications found. Please discuss with provider. Diagnosis:  1. Vaginal bleeding    2. Hypothyroidism, unspecified type    3. Bipolar 1 disorder ([facility]        Disposition:  Patient's disposition: Discharge to home  Patient's condition is stable.          Brittney Wesley DO  05/30/21 1003

## 2021-06-03 LAB
C TRACH DNA GENITAL QL NAA+PROBE: NEGATIVE
N. GONORRHOEAE DNA: NEGATIVE
SOURCE: NORMAL

## 2021-08-05 ENCOUNTER — TELEPHONE (OUTPATIENT)
Dept: FAMILY MEDICINE CLINIC | Age: 51
End: 2021-08-05

## 2021-08-05 NOTE — TELEPHONE ENCOUNTER
Misty/patient's cousin called back and informed patient has moved back in the area and needs to re-establish. Blanca Franks asked to schedule patient for a Physical appt and stated patient is also trying to get on disability. Informed next available for a Physical is 12/7/21, which would be more than 3 yrs since patient was last seen, making her a new patient. Blanca Franks is asking if patient can be seen any sooner as she is also on medication for mental health and trying to get back on track with a PCP. Informed that I will send msg to Dr. Vandana Lopes and will call once I am advised.      Last seen 11/5/2018  Next appt Visit date not found

## 2021-08-05 NOTE — TELEPHONE ENCOUNTER
----- Message from Ingris Cee sent at 8/4/2021 12:38 PM EDT -----  Subject: Appointment Request    Reason for Call: Routine Physical Exam    QUESTIONS  Type of Appointment? New Patient/New to Provider  Reason for appointment request? No appointments available during search  Additional Information for Provider? Patient would like to know if they   can be seen august 30th in the afternoon if so please give a call  ---------------------------------------------------------------------------  --------------  EZBOB  What is the best way for the office to contact you? OK to leave message on   voicemail  Preferred Call Back Phone Number? [phone]  ---------------------------------------------------------------------------  --------------  SCRIPT ANSWERS  Relationship to Patient? Self  Have your symptoms changed? No  Have you been diagnosed with, awaiting test results for, or told that you   are suspected of having COVID-19 (Coronavirus)? (If patient has tested   negative or was tested as a requirement for work, school, or travel and   not based on symptoms, answer no)? No  Do you currently have flu-like symptoms including fever or chills, cough,   shortness of breath, difficulty breathing, or new loss of taste or smell? No  Have you had close contact with someone with COVID-19 in the last 14 days? No  (Service Expert - click yes below to proceed with [employer] As Usual   Scheduling)?  Yes

## 2021-08-09 NOTE — TELEPHONE ENCOUNTER
I would prefer she see someone else  I don't have any new patient openings until the end of August. I don't do any disability exams.

## 2021-08-09 NOTE — TELEPHONE ENCOUNTER
I called patient's cousin, Kusum Carrillo, and informed per Dr. Gilda Gayle that patient could be seen sooner with another provider. Kusum Carrillo asked if patient can be scheduled with Dr. Milner Like to establish care. Informed must send msg for approval and will call back.

## 2021-08-10 NOTE — TELEPHONE ENCOUNTER
I called Conrado Rodrigez and informed. Conrado Rodrigez requested appt with any provider at [facility].   Appt made w/Dr. Rachael Jolley on 10/4/21 at 3:00 pm.

## 2021-09-14 ENCOUNTER — TELEPHONE (OUTPATIENT)
Dept: FAMILY MEDICINE CLINIC | Age: 51
End: 2021-09-14

## 2021-09-20 ENCOUNTER — TELEPHONE (OUTPATIENT)
Dept: FAMILY MEDICINE CLINIC | Age: 51
End: 2021-09-20

## 2021-09-21 NOTE — TELEPHONE ENCOUNTER
Called pt and informed her Dr Jennifer Rausch on medical leave right now and we could schedule her with another provider. Pt declined to schedule at this time.
Donald [facility]   Subject: Appointment Request     Reason for Call: New Patient Request Appointment     QUESTIONS   Type of Appointment? New Patient/New to Provider   Reason for appointment request? No appointments available during search   Additional Information for Provider? Patient is needing to get rescheduled   with Dr. Jv Arechiga, as soon as possible.   ---------------------------------------------------------------------------   --------------   CALL BACK INFO   What is the best way for the office to contact you? OK to leave message on   voicemail   Preferred Call Back Phone Number? [phone]   ---------------------------------------------------------------------------   --------------   SCRIPT ANSWERS   Relationship to Patient? Self   Specialty Confirmation? Primary Care   Is this the first appointment to establish care for a ? No   Have you been diagnosed with, awaiting test results for, or told that you   are suspected of having COVID-19 (Coronavirus)? (If patient has tested   negative or was tested as a requirement for work, school, or travel and   not based on symptoms, answer no)? No   Within the past two weeks have you developed any of the following symptoms   (answer no if symptoms have been present longer than 2 weeks or began   more than 2 weeks ago)? Fever or Chills, Cough, Shortness of breath or   difficulty breathing, Loss of taste or smell, Sore throat, Nasal   congestion, Sneezing or runny nose, Fatigue or generalized body aches   (answer no if pain is specific to a body part e.g. back pain), Diarrhea,   Headache? No   Have you had close contact with someone with COVID-19 in the last 14 days?    No   (Service Expert - click yes below to proceed with [employer] As Usual
Pt is considered a new patient to [facility]. Left vm msg I was returning her call to schedule.
click yes below to proceed with [employer]   Scheduling)?  Yes

## 2021-11-06 ENCOUNTER — HOSPITAL ENCOUNTER (OUTPATIENT)
Dept: MAMMOGRAPHY | Age: 51
Discharge: HOME | End: 2021-11-08
Payer: COMMERCIAL

## 2021-11-06 DIAGNOSIS — Z12.31 ENCOUNTER FOR SCREENING MAMMOGRAM FOR MALIGNANT NEOPLASM OF BREAST: ICD-10-CM

## 2021-11-06 PROCEDURE — 77063 BREAST TOMOSYNTHESIS BI: CPT

## 2022-05-04 ENCOUNTER — OFFICE VISIT (OUTPATIENT)
Dept: ENDOCRINOLOGY | Age: 52
End: 2022-05-04
Payer: COMMERCIAL

## 2022-05-04 VITALS
WEIGHT: 267 LBS | OXYGEN SATURATION: 97 % | BODY MASS INDEX: 39.55 KG/M2 | RESPIRATION RATE: 18 BRPM | DIASTOLIC BLOOD PRESSURE: 72 MMHG | HEIGHT: 69 IN | SYSTOLIC BLOOD PRESSURE: 120 MMHG

## 2022-05-04 DIAGNOSIS — E03.9 HYPOTHYROIDISM, UNSPECIFIED TYPE: Primary | ICD-10-CM

## 2022-05-04 DIAGNOSIS — E55.9 VITAMIN D DEFICIENCY: ICD-10-CM

## 2022-05-04 PROCEDURE — G8417 CALC BMI ABV UP PARAM F/U: HCPCS | Performed by: INTERNAL MEDICINE

## 2022-05-04 PROCEDURE — 1036F TOBACCO NON-USER: CPT | Performed by: INTERNAL MEDICINE

## 2022-05-04 PROCEDURE — 99204 OFFICE O/P NEW MOD 45 MIN: CPT | Performed by: INTERNAL MEDICINE

## 2022-05-04 PROCEDURE — G8427 DOCREV CUR MEDS BY ELIG CLIN: HCPCS | Performed by: INTERNAL MEDICINE

## 2022-05-04 RX ORDER — LEVOTHYROXINE SODIUM 137 UG/1
137 TABLET ORAL DAILY
COMMUNITY
Start: 2022-04-30 | End: 2022-06-28

## 2022-05-04 NOTE — PROGRESS NOTES
[address] Department of Endocrinology Diabetes and Metabolism   [address] [facility] [address]   Phone: [phone]  Fax: [phone]    Date of Service: 5/4/2022  Primary Care Physician: No primary care provider on file. Referring physician: Doreen Sierra *  Provider: Chance Matthews MD        Reason for the visit:  Primary Hypothyroidism    History of Present Illness: The history is provided by the patient. No  was used. Accuracy of the patient data is excellent. Bharat Campuzano is a very pleasant 54 y.o. female seen today for management of hypothyroidism     The patient was diagnosed with hypothyroidism 15 years ago  and since then has been on thyroid hormones replacement. The patient is currently on Levothyroxine 137mcg daily. Patient takes levothyroxine in the morning at empty stomach, wait one hour before eating , avoid multivitamins containing calcium  or iron with it.     For the past few years, the patient has been struggling to keep thyroid level in normal range     Lab Results   Component Value Date/Time    TSH 18.060 (H) 05/30/2021 07:29 AM    TPOABS 475.8 (H) 11/05/2018 12:04 PM     She is not taking any Ca, iron or MV    Lab Results   Component Value Date/Time    TSH 18.060 (H) 05/30/2021 07:29 AM    TPOABS 475.8 (H) 11/05/2018 12:04 PM     PAST MEDICAL HISTORY   Past Medical History:   Diagnosis Date    Abnormal Pap smear of cervix     ALT (SGPT) level raised     Anemia     Anxiety     Arthritis     back    Bronchitis     Complication of anesthesia     Depressive disorder, not elsewhere classified     Dietary surveillance and counseling     History of conization of cervix 3 times in [address] [place] [address] Hx of conization of cervix complicating pregnancy 2084'F    Õie 16    Hyperlipidemia     Hypothyroidism     Leukocytosis, unspecified     Nonspecific elevation of levels of transaminase or lactic acid dehydrogenase (LDH)     Other abnormal blood chemistry     Overactive bladder     Pneumonia        PAST SURGICAL HISTORY   Past Surgical History:   Procedure Laterality Date     SECTION      CHOLECYSTECTOMY      ECHO COMPLETE  2013         LAPAROSCOPY      NERVE BLOCK Left 16    lumbar left medial branch block #1    TONSILLECTOMY      TONSILLECTOMY AND ADENOIDECTOMY      TUBAL LIGATION         SOCIAL HISTORY   Tobacco:   reports that she has quit smoking. Her smoking use included cigarettes. She has a 15.00 pack-year smoking history. She has never used smokeless tobacco.  Alcohol:   reports no history of alcohol use. Drugs:   reports no history of drug use. FAMILY HISTORY   Family History   Problem Relation Age of Onset    Cancer Mother     Stroke Mother     High Blood Pressure Mother    [facility] Other Mother     Heart Disease Mother     Breast Cancer Mother 72    Cancer Sister     Other Brother     Cancer Sister     Breast Cancer Maternal Grandmother     Breast Cancer Maternal Cousin        ALLERGIES AND DRUG REACTIONS   Allergies   Allergen Reactions    Penicillins        CURRENT MEDICATIONS   Current Outpatient Medications   Medication Sig Dispense Refill    ARIPiprazole (ABILIFY) 2 MG tablet Take 2 mg by mouth daily      ARIPiprazole (ABILIFY) 2 MG tablet Take 1 tablet by mouth daily 14 tablet 0    montelukast (SINGULAIR) 10 MG tablet       Loratadine 10 MG CAPS Take 10 mg by mouth daily as needed.  levothyroxine (SYNTHROID) 137 MCG tablet        No current facility-administered medications for this visit. Review of Systems  Constitutional: No fever, no chills, no diaphoresis, no generalized weakness. HEENT: No blurred vision, No sore throat, no ear pain, no hair loss  Neck: denied any neck swelling, difficulty swallowing,   Cadrdiopulomary: No CP, SOB or palpitation, No orthopnea or PND. No cough or wheezing.   GI: No N/V/D, no constipation, No abdominal pain, no melena or hematochezia   : Denied any dysuria, hematuria, flank pain, discharge, or incontinence. Skin: denied any rash, ulcer, Hirsute, or hyperpigmentation. MSK: denied any joint deformity, joint pain/swelling, muscle pain, or back pain. Neuro: no numbess, no tingling, no weakness,     OBJECTIVE    /72   Resp 18   Ht 5' 9\" (1.753 m)   Wt 267 lb (121.1 kg)   SpO2 97%   BMI 39.43 kg/m²   BP Readings from Last 4 Encounters:   05/04/22 120/72   05/30/21 119/73   11/14/18 126/76   11/05/18 118/66     Wt Readings from Last 6 Encounters:   05/04/22 267 lb (121.1 kg)   05/30/21 242 lb (109.8 kg)   11/14/18 226 lb 9.6 oz (102.8 kg)   11/05/18 224 lb (101.6 kg)   10/26/18 229 lb (103.9 kg)   10/19/18 226 lb (102.5 kg)       Physical examination:  General: awake alert, oriented x3, no abnormal position or movements. HEENT: normocephalic non traumatic  Neck: supple, no LN enlargement, no thyromegaly, no thyroid tenderness, no JVD. Pulm: Clear equal air entry no added sounds, no wheezing or rhonchi    CVS: S1 + S2, no murmur, no heave. Dorsalis pedis pulse palpable   Abd: soft lax, no tenderness, no organomegaly, audible bowel sounds. Skin: warm, no lesions, no rash.   Musculoskeletal: No back tenderness, no kyphosis/scoliosis   Neuro: CN intact, sensation normal , muscle power normal.  Psych: normal mood, and affect    Review of Laboratory Data:  I have reviewed the following:  Lab Results   Component Value Date/Time    WBC 10.6 05/30/2021 07:29 AM    RBC 4.28 05/30/2021 07:29 AM    HGB 13.3 05/30/2021 07:29 AM    HCT 39.9 05/30/2021 07:29 AM    MCV 93.2 05/30/2021 07:29 AM    MCH 31.1 05/30/2021 07:29 AM    MCHC 33.3 05/30/2021 07:29 AM    RDW 12.7 05/30/2021 07:29 AM     05/30/2021 07:29 AM    MPV 9.9 05/30/2021 07:29 AM      Lab Results   Component Value Date/Time     05/30/2021 07:29 AM    K 4.1 05/30/2021 07:29 AM    CO2 24 05/30/2021 07:29 AM    BUN 11 05/30/2021 07:29 AM    CREATININE 0.6 05/30/2021 07:29 AM    CALCIUM 9.1 05/30/2021 07:29 AM    LABGLOM >60 05/30/2021 07:29 AM    GFRAA >60 05/30/2021 07:29 AM      Lab Results   Component Value Date/Time    TSH 18.060 (H) 05/30/2021 07:29 AM    TPOABS 475.8 (H) 11/05/2018 12:04 PM     Lab Results   Component Value Date    GLUCOSE 121 05/30/2021     Lab Results   Component Value Date    TRIG 109 11/05/2018    HDL 44 11/05/2018    LDLCALC 109 11/05/2018    CHOL 175 11/05/2018     Lab Results   Component Value Date    VITD25 21 11/05/2018       ASSESSMENT & RECOMMENDATIONS   Maki Wolf, a 54 y.o.-old female seen in for following issues     Primary hypothyroidism   · Currently on synthroid 137 mcg daily (dose was recently adjusted)   · The pt was advised to take levothyroxine in the morning at empty stomach, wait one hour before eating , avoid multivitamins containing calcium  or iron with it. · Check TFT in few weeks     vitD deficiency   · Encourage taking vitD 2000 U/day OTC  · Check level     I personally reviewed external notes from PCP and other patient's care team providers, and personally interpreted labs associated with the above diagnosis. I also ordered labs to further assess and manage the above addressed medical conditions. Return in about 4 months (around 9/4/2022) for hypothyroidism, VitD deficiency. The above issues were reviewed with the patient who understood and agreed with the plan. Thank you for allowing us to participate in the care of this patient. Please do not hesitate to contact us with any additional questions. Diagnosis Orders   1. Hypothyroidism, unspecified type  TSH    FREE T4   2. Vitamin D deficiency  Vitamin D 25 Hydroxy    Basic Metabolic Panel     Waylon Haro MD  Endocrinologist, [facility] Diabetes Care and Endocrinology   [address] [facility], [place] [address]   Phone: [phone]  Fax: [phone]  ------------------------------  An electronic signature was used to authenticate this note.  Esteban Ball Bradley Reed MD on 5/4/2022 at 2:15 PM

## 2022-05-19 PROBLEM — J44.9 CHRONIC OBSTRUCTIVE PULMONARY DISEASE (HCC): Status: ACTIVE | Noted: 2022-05-19

## 2022-05-19 PROBLEM — E66.9 MODERATE OBESITY: Status: ACTIVE | Noted: 2018-11-05

## 2022-05-19 PROBLEM — E66.8 MODERATE OBESITY: Status: ACTIVE | Noted: 2018-11-05

## 2022-06-08 ENCOUNTER — TELEPHONE (OUTPATIENT)
Dept: ADMINISTRATIVE | Age: 52
End: 2022-06-08

## 2022-06-14 ENCOUNTER — HOSPITAL ENCOUNTER (OUTPATIENT)
Age: 52
Discharge: HOME | End: 2022-06-14
Payer: COMMERCIAL

## 2022-06-14 ENCOUNTER — OFFICE VISIT (OUTPATIENT)
Dept: CARDIOLOGY CLINIC | Age: 52
End: 2022-06-14
Payer: COMMERCIAL

## 2022-06-14 VITALS
RESPIRATION RATE: 18 BRPM | WEIGHT: 268.6 LBS | OXYGEN SATURATION: 95 % | HEART RATE: 80 BPM | HEIGHT: 69 IN | BODY MASS INDEX: 39.78 KG/M2 | DIASTOLIC BLOOD PRESSURE: 80 MMHG | SYSTOLIC BLOOD PRESSURE: 122 MMHG

## 2022-06-14 DIAGNOSIS — E03.9 HYPOTHYROIDISM, UNSPECIFIED TYPE: ICD-10-CM

## 2022-06-14 DIAGNOSIS — G47.33 OBSTRUCTIVE SLEEP APNEA: ICD-10-CM

## 2022-06-14 DIAGNOSIS — F09 COGNITIVE DYSFUNCTION: ICD-10-CM

## 2022-06-14 DIAGNOSIS — R06.09 EXERTIONAL DYSPNEA: Primary | ICD-10-CM

## 2022-06-14 DIAGNOSIS — E66.8 MODERATE OBESITY: ICD-10-CM

## 2022-06-14 DIAGNOSIS — E55.9 VITAMIN D DEFICIENCY: ICD-10-CM

## 2022-06-14 DIAGNOSIS — R06.09 EXERTIONAL DYSPNEA: ICD-10-CM

## 2022-06-14 DIAGNOSIS — J44.9 CHRONIC OBSTRUCTIVE PULMONARY DISEASE, UNSPECIFIED COPD TYPE (HCC): ICD-10-CM

## 2022-06-14 LAB
ANION GAP SERPL CALCULATED.3IONS-SCNC: 18 MMOL/L (ref 7–16)
BUN BLDV-MCNC: 8 MG/DL (ref 6–20)
CALCIUM SERPL-MCNC: 9.1 MG/DL (ref 8.6–10.2)
CHLORIDE BLD-SCNC: 98 MMOL/L (ref 98–107)
CO2: 19 MMOL/L (ref 22–29)
CREAT SERPL-MCNC: 0.6 MG/DL (ref 0.5–1)
GFR AFRICAN AMERICAN: >60
GFR NON-AFRICAN AMERICAN: >60 ML/MIN/1.73
GLUCOSE BLD-MCNC: 85 MG/DL (ref 74–99)
POTASSIUM SERPL-SCNC: 4.1 MMOL/L (ref 3.5–5)
PRO-BNP: 45 PG/ML (ref 0–125)
SODIUM BLD-SCNC: 135 MMOL/L (ref 132–146)
T4 FREE: 0.8 NG/DL (ref 0.93–1.7)
TSH SERPL DL<=0.05 MIU/L-ACNC: 16.29 UIU/ML (ref 0.27–4.2)
VITAMIN D 25-HYDROXY: 29 NG/ML (ref 30–100)

## 2022-06-14 PROCEDURE — 1036F TOBACCO NON-USER: CPT | Performed by: INTERNAL MEDICINE

## 2022-06-14 PROCEDURE — G8417 CALC BMI ABV UP PARAM F/U: HCPCS | Performed by: INTERNAL MEDICINE

## 2022-06-14 PROCEDURE — 83880 ASSAY OF NATRIURETIC PEPTIDE: CPT

## 2022-06-14 PROCEDURE — 99204 OFFICE O/P NEW MOD 45 MIN: CPT | Performed by: INTERNAL MEDICINE

## 2022-06-14 PROCEDURE — G8427 DOCREV CUR MEDS BY ELIG CLIN: HCPCS | Performed by: INTERNAL MEDICINE

## 2022-06-14 PROCEDURE — 36415 COLL VENOUS BLD VENIPUNCTURE: CPT

## 2022-06-14 PROCEDURE — 93000 ELECTROCARDIOGRAM COMPLETE: CPT | Performed by: INTERNAL MEDICINE

## 2022-06-14 PROCEDURE — 3023F SPIROM DOC REV: CPT | Performed by: INTERNAL MEDICINE

## 2022-06-14 RX ORDER — GUAIFENESIN 100 MG/5ML
200 SYRUP ORAL 3 TIMES DAILY PRN
COMMUNITY

## 2022-06-14 RX ORDER — DONEPEZIL HYDROCHLORIDE 10 MG/1
10 TABLET, FILM COATED ORAL NIGHTLY
COMMUNITY
Start: 2022-05-26

## 2022-06-14 RX ORDER — BENZTROPINE MESYLATE 2 MG/1
2 TABLET ORAL 2 TIMES DAILY
COMMUNITY
Start: 2022-05-26

## 2022-06-14 RX ORDER — IBUPROFEN 200 MG
200 TABLET ORAL EVERY 6 HOURS PRN
COMMUNITY

## 2022-06-14 RX ORDER — PHENOL 1.4 %
1 AEROSOL, SPRAY (ML) MUCOUS MEMBRANE DAILY
COMMUNITY

## 2022-06-14 RX ORDER — TRAZODONE HYDROCHLORIDE 100 MG/1
100 TABLET ORAL NIGHTLY
COMMUNITY
Start: 2022-05-26

## 2022-06-14 RX ORDER — DIVALPROEX SODIUM 500 MG/1
500 TABLET, DELAYED RELEASE ORAL 2 TIMES DAILY
COMMUNITY
Start: 2022-05-26

## 2022-06-14 RX ORDER — PALIPERIDONE 6 MG/1
6 TABLET, EXTENDED RELEASE ORAL EVERY MORNING
COMMUNITY
Start: 2022-04-03

## 2022-06-14 RX ORDER — DIVALPROEX SODIUM 250 MG/1
250 TABLET, DELAYED RELEASE ORAL 2 TIMES DAILY
COMMUNITY
Start: 2022-05-26

## 2022-06-14 RX ORDER — DOCUSATE SODIUM 100 MG/1
100 CAPSULE, LIQUID FILLED ORAL 2 TIMES DAILY
COMMUNITY

## 2022-06-14 RX ORDER — DIPHENHYDRAMINE HYDROCHLORIDE 25 MG/1
25 CAPSULE ORAL
COMMUNITY
Start: 2022-04-30

## 2022-06-14 NOTE — PROGRESS NOTES
OUTPATIENT CARDIOLOGY CONSULT    Name: Deanna Mckenzie    Age: 54 y.o. Date of Service: 6/14/2022    Reason for Consultation: Social dyspnea, chronic obstructive lung disease, moderate obesity, obstructive sleep apnea, cognitive dysfunction    Referring Physician: Elijah Chow NP    History of Present Illness: The patient is a 44-year-old white female with no known history of structural heart disease and a risk factor profile predominately limited to that of prolonged tobacco consumption with associated chronic obstructive lung disease. She additionally has a history of moderate obesity with previously documented obstructive sleep apnea and noncompliance with the use of nocturnal CPAP. She presents with a several month history of mildly progressive exertional dyspnea (functional class II) as well as that of intermittent lower extremity edema, albeit not at present time of her evaluation. She denies any symptoms of anginal-like chest discomfort or other ischemic equivalents nor additional significant manifestations of volume overload and remotely underwent objective assessment both echocardiographically and with that of a gated vasodilator myocardial perfusion imaging study demonstrating no evidence of abnormalities. She presently denies arrhythmia related symptomatology. She presently admits to tobacco consumption in excess of 1 pack of cigarettes on a daily basis and is presently normotensive. Review of Systems: The remainder of a complete multisystem review including consitutional, central nervous, respiratory, circulatory, gastrointestinal, genitourinary, endocrinologic, hematologic, musculoskeletal and psychiatric are negative.     Past Medical History:  Past Medical History:   Diagnosis Date    Abnormal Pap smear of cervix     ALT (SGPT) level raised     Anemia     Anxiety     Arthritis     back    Bronchitis     Complication of anesthesia     Depressive disorder, not elsewhere classified     Dietary surveillance and counseling     History of conization of cervix 3 times in [facility] Hx of conization of cervix complicating pregnancy 7690'Y    Õie 16    Hyperlipidemia     Hypothyroidism     Leukocytosis, unspecified     Nonspecific elevation of levels of transaminase or lactic acid dehydrogenase (LDH)     Other abnormal blood chemistry     Overactive bladder     Pneumonia        Past Surgical History:  Past Surgical History:   Procedure Laterality Date     SECTION      CHOLECYSTECTOMY      ECHO COMPLETE  2013         LAPAROSCOPY      NERVE BLOCK Left 16    lumbar left medial branch block #1    TONSILLECTOMY      TONSILLECTOMY AND ADENOIDECTOMY      TUBAL LIGATION         Family History:  Family History   Problem Relation Age of Onset    Cancer Mother     Stroke Mother     High Blood Pressure Mother    [place] Other Mother     Heart Disease Mother     Breast Cancer Mother 72    Cancer Sister     Other Brother     Cancer Sister     Breast Cancer Maternal Grandmother     Breast Cancer Maternal Cousin        Social History:  Social History     Socioeconomic History    Marital status:      Spouse name: Not on file    Number of children: Not on file    Years of education: Not on file    Highest education level: Not on file   Occupational History    Occupation: home health aide and    Tobacco Use    Smoking status: Former Smoker     Packs/day: 0.50     Years: 30.00     Pack years: 15.00     Types: Cigarettes    Smokeless tobacco: Never Used   Vaping Use    Vaping Use: Never used   Substance and Sexual Activity    Alcohol use: No    Drug use: No    Sexual activity: Not Currently     Partners: Male   Other Topics Concern    Not on file   Social History Narrative    Not on file     Social Determinants of Health     Financial Resource Strain:     Difficulty of Paying Living Expenses: Not on file   Food Insecurity:     mouth every 6 hours as needed for Pain      magnesium hydroxide (MILK OF MAGNESIA) 400 MG/5ML suspension Take by mouth daily as needed for Constipation      guaiFENesin (ROBITUSSIN) 100 MG/5ML syrup Take 200 mg by mouth 3 times daily as needed for Cough      levothyroxine (SYNTHROID) 137 MCG tablet 137 mcg Daily       ARIPiprazole (ABILIFY) 2 MG tablet Take 1 tablet by mouth daily 14 tablet 0    montelukast (SINGULAIR) 10 MG tablet 10 mg nightly       Loratadine 10 MG CAPS Take 10 mg by mouth daily as needed.  ARIPiprazole (ABILIFY) 2 MG tablet Take 2 mg by mouth daily (Patient not taking: Reported on 6/14/2022)       No current facility-administered medications for this visit. Physical Exam:  /80   Pulse 80   Resp 18   Ht 5' 9\" (1.753 m)   Wt 268 lb 9.6 oz (121.8 kg)   SpO2 95%   BMI 39.67 kg/m²   Wt Readings from Last 3 Encounters:   06/14/22 268 lb 9.6 oz (121.8 kg)   05/04/22 267 lb (121.1 kg)   05/30/21 242 lb (109.8 kg)     The patient is awake, alert and in no discomfort or distress. No gross musculoskeletal deformity or lymphadenopathy are present. No significant skin or nail changes are present. Gross examination of head, eyes, nose and throat are negative. Jugular venous pressure is normal and no carotid bruits are present. No thyromegaly is noted. Normal respiratory effort is noted with no accessory muscle usage present. Lung fields are clear to ascultation. Cardiac examination is notable for a regular rate and rhythm with no palpable thrill. No gallop rhythm or cardiac murmur are identified. A benign abdominal examination is present with the exception of obesity no masses or organomegaly. A normal abdominal aortic pulsation is present. Intact pulses are present throughout all extremities and no peripheral edema is present. No focal neurologic deficits are present.     Laboratory Tests:  Lab Results   Component Value Date    CREATININE 0.6 05/30/2021    BUN 11 05/30/2021  05/30/2021    K 4.1 05/30/2021     05/30/2021    CO2 24 05/30/2021     No results found for: BNP  Lab Results   Component Value Date    WBC 10.6 05/30/2021    RBC 4.28 05/30/2021    HGB 13.3 05/30/2021    HCT 39.9 05/30/2021    MCV 93.2 05/30/2021    MCH 31.1 05/30/2021    MCHC 33.3 05/30/2021    RDW 12.7 05/30/2021     05/30/2021    MPV 9.9 05/30/2021     No results for input(s): ALKPHOS, ALT, AST, PROT, BILITOT, BILIDIR, LABALBU in the last 72 hours. No results found for: MG  Lab Results   Component Value Date    PROTIME 11.8 10/28/2015    INR 1.0 10/28/2015     Lab Results   Component Value Date    TSH 18.060 05/30/2021     No components found for: CHLPL  Lab Results   Component Value Date    TRIG 109 11/05/2018     Lab Results   Component Value Date    HDL 44 11/05/2018     Lab Results   Component Value Date    LDLCALC 109 (H) 11/05/2018       Cardiac Tests:  ECG: A resting electrocardiogram demonstrates evidence of sinus rhythm with right axis deviation, low voltage and is otherwise unremarkable      ASSESSMENT / PLAN: On a clinical basis, the patient presents with exertional dyspnea clear of a multifactorial origin with factors inclusive of her chronic obstructive lung disease in addition to that of her obesity and nontreated obstructive sleep apnea which were extensively discussed at the time of her evaluation. No present clinical evidence of volume overload is present, I have recommended a proBNP level to further assess this in addition to that of an echocardiogram to assess the combination of left and right ventricular systolic function and diastolic function to guide additional recommendations. I will provide these as appropriate following its completion review.   I have extensively discussed her needs of appropriate lifestyle modification to achieve weight reduction to benefit diastolic cardiac performance as well as assisting in management of her obstructive sleep apnea with the needs of a repeat formal sleep assessment and reinitiation of nocturnal CPAP as appropriate to reduce risk of adverse cardiovascular effects. Ongoing aggressive risk factor modification of blood pressure and serum lipids will remain essential to reducing risk of future atherosclerotic development. Unless dictated by her echocardiographic findings I will return her to your care would happily reassess her in the future should additional cardiovascular difficulties or concerns arise. Follow-up office visit based on echocardiographic results    Thank you for allowing me to participate in your patient's care. Please feel free to contact me if you have any questions or concerns. Note: This report was completed utilizing a computerized voice recognition software. Every effort has been made to insure accuracy, however; inadvertent computerized transcription errors may be present. Therese Lee.  [facility], [address] [facility] Cardiology

## 2022-06-28 ENCOUNTER — TELEPHONE (OUTPATIENT)
Dept: ENDOCRINOLOGY | Age: 52
End: 2022-06-28

## 2022-06-28 DIAGNOSIS — E03.9 HYPOTHYROIDISM, UNSPECIFIED TYPE: Primary | ICD-10-CM

## 2022-06-28 RX ORDER — LEVOTHYROXINE SODIUM 0.15 MG/1
150 TABLET ORAL DAILY
Qty: 90 TABLET | Refills: 1 | Status: SHIPPED | OUTPATIENT
Start: 2022-06-28

## 2022-06-28 NOTE — TELEPHONE ENCOUNTER
----- Message from CARLOS ENRIQUE Ron - CNS sent at 6/27/2022  4:47 PM EDT -----  Please call patient and inform her thyroid function testing is not at goal  Please inquire if patient is missing any doses of levothyroxine and taking on an empty stomach at least 1 hour before breakfast  If patient is not missing doses I recommend increasing levothyroxine to 150 mcg 1 tablet once daily

## 2022-06-28 NOTE — TELEPHONE ENCOUNTER
Called, spoke to patient and gave results and recommendations/instructions    Pt states not missing any doses.  Will send script for new dose

## 2022-07-07 ENCOUNTER — TELEPHONE (OUTPATIENT)
Dept: CARDIOLOGY CLINIC | Age: 52
End: 2022-07-07

## 2022-07-07 NOTE — TELEPHONE ENCOUNTER
----- Message from Ken Vidal MD sent at 6/14/2022  8:47 PM EDT -----  Please notify patient that laboratory studies do not suggest fluid retention nedding change of medications.  Continue as directed pending echocardiogram

## 2022-07-07 NOTE — TELEPHONE ENCOUNTER
Notified patient that laboratory studies do not suggest fluid retention nedding change of medications. Continue as directed pending echocardiogram. Per Dr. Amisha Boogie.

## 2022-09-08 ENCOUNTER — OFFICE VISIT (OUTPATIENT)
Dept: ENDOCRINOLOGY | Age: 52
End: 2022-09-08
Payer: COMMERCIAL

## 2022-09-08 VITALS
BODY MASS INDEX: 38.66 KG/M2 | RESPIRATION RATE: 16 BRPM | WEIGHT: 261 LBS | OXYGEN SATURATION: 96 % | SYSTOLIC BLOOD PRESSURE: 113 MMHG | HEART RATE: 72 BPM | DIASTOLIC BLOOD PRESSURE: 78 MMHG | HEIGHT: 69 IN

## 2022-09-08 DIAGNOSIS — E55.9 VITAMIN D DEFICIENCY: ICD-10-CM

## 2022-09-08 DIAGNOSIS — E03.9 HYPOTHYROIDISM, UNSPECIFIED TYPE: ICD-10-CM

## 2022-09-08 DIAGNOSIS — E03.9 HYPOTHYROIDISM, UNSPECIFIED TYPE: Primary | ICD-10-CM

## 2022-09-08 LAB
T4 FREE: 1.45 NG/DL (ref 0.93–1.7)
T4 TOTAL: 9.3 MCG/DL (ref 4.5–11.7)
TSH SERPL DL<=0.05 MIU/L-ACNC: 2.69 UIU/ML (ref 0.27–4.2)

## 2022-09-08 PROCEDURE — 99214 OFFICE O/P EST MOD 30 MIN: CPT | Performed by: INTERNAL MEDICINE

## 2022-09-08 PROCEDURE — 1036F TOBACCO NON-USER: CPT | Performed by: INTERNAL MEDICINE

## 2022-09-08 PROCEDURE — G8417 CALC BMI ABV UP PARAM F/U: HCPCS | Performed by: INTERNAL MEDICINE

## 2022-09-08 PROCEDURE — G8427 DOCREV CUR MEDS BY ELIG CLIN: HCPCS | Performed by: INTERNAL MEDICINE

## 2022-09-08 NOTE — PROGRESS NOTES
[address] [place] [address] Department of Endocrinology Diabetes and Metabolism   [address] [facility] [address]   Phone: [phone]  Fax: [phone]    Date of Service: 9/8/2022  Primary Care Physician: CARLOS ENRIQUE Jaramillo - CNP  Referring physician: No ref. provider found  Provider: Larry Zarate MD        Reason for the visit:  Primary Hypothyroidism    History of Present Illness: The history is provided by the patient. No  was used. Accuracy of the patient data is excellent. Callie Simon is a very pleasant 54 y.o. female seen today for management of hypothyroidism     The patient was diagnosed with hypothyroidism 15 years ago  and since then has been on thyroid hormones replacement. The patient is currently on Levothyroxine 150mcg daily. Patient takes levothyroxine in the morning at empty stomach, wait one hour before eating , avoid multivitamins containing calcium  or iron with it.   Sode was increased from 137 to 150 mcg daily in June/2022 as  TSH was 16.2     Due for labs now    Feels good on current dose    For the past few years, the patient has been struggling to keep thyroid level in normal range     Lab Results   Component Value Date/Time    TSH 16.290 (H) 06/14/2022 10:33 AM    T4FREE 0.80 (L) 06/14/2022 10:33 AM    TPOABS 475.8 (H) 11/05/2018 12:04 PM     She is not taking any Ca, iron or MV    Lab Results   Component Value Date/Time    TSH 16.290 (H) 06/14/2022 10:33 AM    T4FREE 0.80 (L) 06/14/2022 10:33 AM    TPOABS 475.8 (H) 11/05/2018 12:04 PM     PAST MEDICAL HISTORY   Past Medical History:   Diagnosis Date    Abnormal Pap smear of cervix     ALT (SGPT) level raised     Anemia     Anxiety     Arthritis     back    Bronchitis     Complication of anesthesia     Depressive disorder, not elsewhere classified     Dietary surveillance and counseling     History of conization of cervix 3 times in [place]    Hx of conization of cervix complicating pregnancy 0489'M    Õie 16    Hyperlipidemia     Hypothyroidism     Leukocytosis, unspecified     Nonspecific elevation of levels of transaminase or lactic acid dehydrogenase (LDH)     Other abnormal blood chemistry     Overactive bladder     Pneumonia        PAST SURGICAL HISTORY   Past Surgical History:   Procedure Laterality Date     SECTION      CHOLECYSTECTOMY      ECHO COMPLETE  2013         LAPAROSCOPY      NERVE BLOCK Left 16    lumbar left medial branch block #1    TONSILLECTOMY      TONSILLECTOMY AND ADENOIDECTOMY      TUBAL LIGATION         SOCIAL HISTORY   Tobacco:   reports that she has quit smoking. Her smoking use included cigarettes. She has a 15.00 pack-year smoking history. She has never used smokeless tobacco.  Alcohol:   reports no history of alcohol use. Drugs:   reports no history of drug use.     FAMILY HISTORY   Family History   Problem Relation Age of Onset    Cancer Mother     Stroke Mother     High Blood Pressure Mother     Other Mother     Heart Disease Mother     Breast Cancer Mother 72    Cancer Sister     Other Brother     Cancer Sister     Breast Cancer Maternal Grandmother     Breast Cancer Maternal Cousin        ALLERGIES AND DRUG REACTIONS   Allergies   Allergen Reactions    Penicillins        CURRENT MEDICATIONS   Current Outpatient Medications   Medication Sig Dispense Refill    levothyroxine (SYNTHROID) 150 MCG tablet Take 1 tablet by mouth daily 90 tablet 1    paliperidone (INVEGA) 6 MG extended release tablet 6 mg every morning       donepezil (ARICEPT) 10 MG tablet 10 mg nightly       traZODone (DESYREL) 100 MG tablet 100 mg nightly       benztropine (COGENTIN) 2 MG tablet 2 mg 2 times daily       divalproex (DEPAKOTE) 250 MG DR tablet 250 mg in the morning and at bedtime       divalproex (DEPAKOTE) 500 MG DR tablet 500 mg in the morning and at bedtime       BANOPHEN 25 MG capsule 25 mg       calcium carbonate 600 MG TABS tablet Take 1 in [address])   Check level today   The pt was advised to take levothyroxine in the morning at empty stomach, wait one hour before eating , avoid multivitamins containing calcium  or iron with it. Check TFT in few weeks     vitD deficiency   Continue vitD supplement     I personally reviewed external notes from PCP and other patient's care team providers, and personally interpreted labs associated with the above diagnosis. I also ordered labs to further assess and manage the above addressed medical conditions. Return in about 6 months (around 3/8/2023) for hypothyroidism, VitD deficiency. The above issues were reviewed with the patient who understood and agreed with the plan. Thank you for allowing us to participate in the care of this patient. Please do not hesitate to contact us with any additional questions. Diagnosis Orders   1. Hypothyroidism, unspecified type  TSH    T4, Free    T4      2. Vitamin D deficiency            Felipe Castro MD  Endocrinologist, [facility] Diabetes Care and Endocrinology   [address] [facility] [address]   Phone: [phone]  Fax: [phone]  ------------------------------  An electronic signature was used to authenticate this note.  Emmanuel Milligan MD on 9/8/2022 at 2:14 PM

## 2022-09-09 ENCOUNTER — TELEPHONE (OUTPATIENT)
Dept: ENDOCRINOLOGY | Age: 52
End: 2022-09-09

## 2022-09-28 LAB
ALBUMIN SERPL-MCNC: 3.5 G/DL (ref 3.5–5.2)
ALP BLD-CCNC: 65 U/L (ref 35–104)
ALT SERPL-CCNC: 41 U/L (ref 0–32)
ANION GAP SERPL CALCULATED.3IONS-SCNC: 11 MMOL/L (ref 7–16)
AST SERPL-CCNC: 38 U/L (ref 0–31)
BASOPHILS ABSOLUTE: 0.05 E9/L (ref 0–0.2)
BASOPHILS RELATIVE PERCENT: 0.5 % (ref 0–2)
BILIRUB SERPL-MCNC: 0.2 MG/DL (ref 0–1.2)
BUN BLDV-MCNC: 6 MG/DL (ref 6–20)
CALCIUM SERPL-MCNC: 9.8 MG/DL (ref 8.6–10.2)
CHLORIDE BLD-SCNC: 99 MMOL/L (ref 98–107)
CHOLESTEROL, TOTAL: 221 MG/DL (ref 0–199)
CO2: 24 MMOL/L (ref 22–29)
CREAT SERPL-MCNC: 0.7 MG/DL (ref 0.5–1)
EOSINOPHILS ABSOLUTE: 0.15 E9/L (ref 0.05–0.5)
EOSINOPHILS RELATIVE PERCENT: 1.5 % (ref 0–6)
GFR AFRICAN AMERICAN: >60
GFR NON-AFRICAN AMERICAN: >60 ML/MIN/1.73
GLUCOSE BLD-MCNC: 89 MG/DL (ref 74–99)
HBA1C MFR BLD: 5.6 % (ref 4–5.6)
HCT VFR BLD CALC: 43.9 % (ref 34–48)
HDLC SERPL-MCNC: 38 MG/DL
HEMOGLOBIN: 14.7 G/DL (ref 11.5–15.5)
IMMATURE GRANULOCYTES #: 0.12 E9/L
IMMATURE GRANULOCYTES %: 1.2 % (ref 0–5)
LDL CHOLESTEROL CALCULATED: 145 MG/DL (ref 0–99)
LYMPHOCYTES ABSOLUTE: 2.72 E9/L (ref 1.5–4)
LYMPHOCYTES RELATIVE PERCENT: 27.7 % (ref 20–42)
MCH RBC QN AUTO: 32.5 PG (ref 26–35)
MCHC RBC AUTO-ENTMCNC: 33.5 % (ref 32–34.5)
MCV RBC AUTO: 97.1 FL (ref 80–99.9)
MONOCYTES ABSOLUTE: 0.93 E9/L (ref 0.1–0.95)
MONOCYTES RELATIVE PERCENT: 9.5 % (ref 2–12)
NEUTROPHILS ABSOLUTE: 5.84 E9/L (ref 1.8–7.3)
NEUTROPHILS RELATIVE PERCENT: 59.6 % (ref 43–80)
PDW BLD-RTO: 12.3 FL (ref 11.5–15)
PLATELET # BLD: 283 E9/L (ref 130–450)
PMV BLD AUTO: 9.7 FL (ref 7–12)
POTASSIUM SERPL-SCNC: 4.7 MMOL/L (ref 3.5–5)
RBC # BLD: 4.52 E12/L (ref 3.5–5.5)
SODIUM BLD-SCNC: 134 MMOL/L (ref 132–146)
T3 TOTAL: 82.96 NG/DL (ref 80–200)
T4 TOTAL: 7.9 MCG/DL (ref 4.5–11.7)
TOTAL PROTEIN: 7.7 G/DL (ref 6.4–8.3)
TRIGL SERPL-MCNC: 190 MG/DL (ref 0–149)
TSH SERPL DL<=0.05 MIU/L-ACNC: 3.73 UIU/ML (ref 0.27–4.2)
VALPROIC ACID LEVEL: 81 MCG/ML (ref 50–100)
VITAMIN B-12: 812 PG/ML (ref 211–946)
VITAMIN D 25-HYDROXY: 32 NG/ML (ref 30–100)
VLDLC SERPL CALC-MCNC: 38 MG/DL
WBC # BLD: 9.8 E9/L (ref 4.5–11.5)

## 2022-10-04 ENCOUNTER — TELEPHONE (OUTPATIENT)
Dept: CARDIOLOGY CLINIC | Age: 52
End: 2022-10-04

## 2022-10-04 ENCOUNTER — HOSPITAL ENCOUNTER (OUTPATIENT)
Dept: CARDIOLOGY | Age: 52
Discharge: HOME | End: 2022-10-04
Payer: COMMERCIAL

## 2022-10-04 DIAGNOSIS — G47.33 OBSTRUCTIVE SLEEP APNEA: ICD-10-CM

## 2022-10-04 DIAGNOSIS — R06.09 EXERTIONAL DYSPNEA: ICD-10-CM

## 2022-10-04 LAB
LV EF: 63 %
LVEF MODALITY: NORMAL

## 2022-10-04 PROCEDURE — 93306 TTE W/DOPPLER COMPLETE: CPT

## 2022-10-04 NOTE — TELEPHONE ENCOUNTER
----- Message from Robert Rivas MD sent at 10/4/2022 10:22 AM EDT -----  Please notify patient that echocardiogram demonstrates normal heart muscle function with dyspnea predominantly related to nonheart related conditions.   Continue as directed and follow-up with primary care and we will further evaluate should additional cardiovascular difficulties or concerns arise Fabienne Caldwell  (RN)  2020 11:17:53

## 2022-10-13 ENCOUNTER — HOSPITAL ENCOUNTER (OUTPATIENT)
Dept: GENERAL RADIOLOGY | Age: 52
Discharge: HOME | End: 2022-10-15
Payer: COMMERCIAL

## 2022-10-13 ENCOUNTER — HOSPITAL ENCOUNTER (OUTPATIENT)
Age: 52
Discharge: HOME | End: 2022-10-15
Payer: COMMERCIAL

## 2022-10-13 DIAGNOSIS — M25.511 ACUTE PAIN OF BOTH SHOULDERS: ICD-10-CM

## 2022-10-13 DIAGNOSIS — M54.2 CERVICALGIA: ICD-10-CM

## 2022-10-13 DIAGNOSIS — M25.561 ACUTE PAIN OF BOTH KNEES: ICD-10-CM

## 2022-10-13 DIAGNOSIS — M25.562 ACUTE PAIN OF BOTH KNEES: ICD-10-CM

## 2022-10-13 DIAGNOSIS — M54.50 LOW BACK PAIN, UNSPECIFIED BACK PAIN LATERALITY, UNSPECIFIED CHRONICITY, UNSPECIFIED WHETHER SCIATICA PRESENT: ICD-10-CM

## 2022-10-13 DIAGNOSIS — M25.512 ACUTE PAIN OF BOTH SHOULDERS: ICD-10-CM

## 2022-10-13 PROCEDURE — 72100 X-RAY EXAM L-S SPINE 2/3 VWS: CPT

## 2022-10-13 PROCEDURE — 72050 X-RAY EXAM NECK SPINE 4/5VWS: CPT

## 2022-10-13 PROCEDURE — 73560 X-RAY EXAM OF KNEE 1 OR 2: CPT

## 2022-10-13 PROCEDURE — 72220 X-RAY EXAM SACRUM TAILBONE: CPT

## 2022-10-13 PROCEDURE — 73030 X-RAY EXAM OF SHOULDER: CPT

## 2022-12-02 ENCOUNTER — TELEPHONE (OUTPATIENT)
Dept: SLEEP CENTER | Age: 52
End: 2022-12-02

## 2022-12-09 ENCOUNTER — HOSPITAL ENCOUNTER (OUTPATIENT)
Dept: SLEEP CENTER | Age: 52
Discharge: HOME | End: 2022-12-09

## 2022-12-09 DIAGNOSIS — G47.33 OSA (OBSTRUCTIVE SLEEP APNEA): Primary | ICD-10-CM

## 2022-12-23 NOTE — PROCEDURES
[address] [place] [address]       [address]              DIAGNOSTIC POLYSOMNOGRAPHY SLEEP STUDY REPORT       PATIENT NAME: Johnny Mckeon               : 1970        MED REC NO:  [id number]     ACCOUNT NO: [de-identified]   PROVIDER:  Carolina Schneider DO  DATE OF STUDY: 2022     SERVICE PROVIDER:  Kenzie Dowell     REFERRING PROVIDER:   MERARY Ann    AGE: 50 y.o.  yrs      SEX: female           HEIGHT: 71 in         WEIGHT: 190 lbs          BMI: 28.1 kg/m2    NECK CIRCUMFERENCE: 16.5 in    Symptoms: Loud snoring, witnessed apneas, morning headaches, difficulty initiating and maintaining sleep. The [place] Sleepiness Scale was 3 out of 24 (scores above or equal to 10 are suggestive of hypersomnolence). Indication: Evaluate for obstructive sleep apnea.     Past Medical History:   Diagnosis Date    Abnormal Pap smear of cervix     ALT (SGPT) level raised     Anemia     Anxiety     Arthritis     back    Bronchitis     Complication of anesthesia     Depressive disorder, not elsewhere classified     Dietary surveillance and counseling     History of conization of cervix 3 times in [place]    Hx of conization of cervix complicating pregnancy 6364'H    Õie 16    Hyperlipidemia     Hypothyroidism     Leukocytosis, unspecified     Nonspecific elevation of levels of transaminase or lactic acid dehydrogenase (LDH)     Other abnormal blood chemistry     Overactive bladder     Pneumonia          Current Outpatient Medications:     levothyroxine (SYNTHROID) 150 MCG tablet, Take 1 tablet by mouth daily, Disp: 90 tablet, Rfl: 1    paliperidone (INVEGA) 6 MG extended release tablet, 6 mg every morning , Disp: , Rfl:     donepezil (ARICEPT) 10 MG tablet, 10 mg nightly , Disp: , Rfl:     traZODone (DESYREL) 100 MG tablet, 100 mg nightly , Disp: , Rfl:     benztropine (COGENTIN) 2 MG tablet, 2 mg 2 times daily , Disp: , Rfl:     divalproex (DEPAKOTE) 250 MG DR tablet, 250 mg in the morning and at bedtime , Disp: , Rfl:     divalproex (DEPAKOTE) 500 MG DR tablet, 500 mg in the morning and at bedtime , Disp: , Rfl:     BANOPHEN 25 MG capsule, 25 mg , Disp: , Rfl:     calcium carbonate 600 MG TABS tablet, Take 1 tablet by mouth daily, Disp: , Rfl:     docusate sodium (COLACE) 100 MG capsule, Take 100 mg by mouth 2 times daily, Disp: , Rfl:     ibuprofen (ADVIL;MOTRIN) 200 MG tablet, Take 200 mg by mouth every 6 hours as needed for Pain, Disp: , Rfl:     magnesium hydroxide (MILK OF MAGNESIA) 400 MG/5ML suspension, Take by mouth daily as needed for Constipation, Disp: , Rfl:     guaiFENesin (ROBITUSSIN) 100 MG/5ML syrup, Take 200 mg by mouth 3 times daily as needed for Cough, Disp: , Rfl:     ARIPiprazole (ABILIFY) 2 MG tablet, Take 1 tablet by mouth daily, Disp: 14 tablet, Rfl: 0    montelukast (SINGULAIR) 10 MG tablet, 10 mg nightly , Disp: , Rfl:     Loratadine 10 MG CAPS, Take 10 mg by mouth daily as needed. , Disp: , Rfl:      DESCRIPTION: This full night polysomnogram consisted of EEG, EOG, EMG and 2-lead ECG monitoring. Oronasal airflow (nasal pressure transducer and thermistor), chest and abdominal efforts by respiratory inductance plethysmography or polyvinylidene fluoride (PVDF) sensor, and pulse oximetry were monitored as well. Hypopneas were scored as at least a 30% reduction in amplitude of the semi-quantitative flow signal, associated with a 4% or greater oxygen desaturation. Respiratory effort related arousals (RERAs) were scored as at least a 30% reduction in amplitude of the semi-quantitative flow signal, associated with an EEG microarousal.     FINDINGS:  SLEEP CONTINUITY AND SLEEP ARCHITECTURE:  Lights out at 9:29:07 PM and lights were turned on at 4:52:52 AM, for a total recording time (TRT) of 443.8 minutes.      The patient did not achieve any sleep during this study.    RESPIRATORY MONITORING: Analysis of continuous oxygen saturations showed a mean SpO2 value of 91.9% throughout the study. EEG: No abnormal epileptiform activity was observed. ECG: The electrocardiogram documented normal sinus rhythm. The average heart rate was 52. IMPRESSION:    1. The patient did not achieve any sleep on this sleep study. She stated that she was not tired upon arrival, and at times has difficulty sleeping due to her medications. RECOMMENDATIONS:    1.  Lack of sleep on this study may correlated clinically to insomnia. If clinically appropriate, recommend treatment such as cognitive behavioral therapy for insomnia prior to a repeat sleep study.

## 2023-03-08 ENCOUNTER — OFFICE VISIT (OUTPATIENT)
Dept: ENDOCRINOLOGY | Age: 53
End: 2023-03-08

## 2023-03-08 VITALS
WEIGHT: 232 LBS | SYSTOLIC BLOOD PRESSURE: 96 MMHG | HEIGHT: 69 IN | BODY MASS INDEX: 34.36 KG/M2 | RESPIRATION RATE: 18 BRPM | DIASTOLIC BLOOD PRESSURE: 55 MMHG | OXYGEN SATURATION: 99 % | HEART RATE: 84 BPM

## 2023-03-08 DIAGNOSIS — E03.9 HYPOTHYROIDISM, UNSPECIFIED TYPE: Primary | ICD-10-CM

## 2023-03-08 DIAGNOSIS — I95.9 HYPOTENSION, UNSPECIFIED HYPOTENSION TYPE: ICD-10-CM

## 2023-03-08 DIAGNOSIS — R53.82 CHRONIC FATIGUE: ICD-10-CM

## 2023-03-08 DIAGNOSIS — E55.9 VITAMIN D DEFICIENCY: ICD-10-CM

## 2023-03-08 DIAGNOSIS — E03.9 HYPOTHYROIDISM, UNSPECIFIED TYPE: ICD-10-CM

## 2023-03-08 LAB
CORTISOL TOTAL: 13.22 MCG/DL (ref 2.68–18.4)
T4 FREE: 1.73 NG/DL (ref 0.93–1.7)
TSH SERPL DL<=0.05 MIU/L-ACNC: 0.33 UIU/ML (ref 0.27–4.2)

## 2023-03-08 NOTE — PROGRESS NOTES
[address] [place] [address] Department of Endocrinology Diabetes and Metabolism   [address] [facility] [address]   Phone: [phone]  Fax: [phone]    Date of Service: 3/8/2023  Primary Care Physician: Marie Mayberry APRN - CNP  Provider: Cristin Mills MD        Reason for the visit:  Primary Hypothyroidism    History of Present Illness: The history is provided by the patient. No  was used. Accuracy of the patient data is excellent. Jose R Ortiz is a very pleasant 50 y.o. female seen today for management of hypothyroidism     The patient was diagnosed with hypothyroidism 15 years ago  and since then has been on thyroid hormones replacement. The patient is currently on Levothyroxine 150mcg daily. Patient takes levothyroxine in the morning at empty stomach, wait one hour before eating , avoid multivitamins containing calcium  or iron with it.     Due for labs now    For the past few years, the patient has been struggling to keep thyroid level in normal range     Lab Results   Component Value Date/Time    TSH 3.730 09/28/2022 08:39 AM    T4FREE 1.45 09/08/2022 02:26 PM    U6CDZXF 7.9 09/28/2022 08:39 AM    P4MBQVI 82.96 09/28/2022 08:39 AM    TPOABS 475.8 (H) 11/05/2018 12:04 PM     She is not taking any Ca, iron or MV    Lab Results   Component Value Date/Time    TSH 3.730 09/28/2022 08:39 AM    T4FREE 1.45 09/08/2022 02:26 PM    W7VSHIC 7.9 09/28/2022 08:39 AM    F9LFQWK 82.96 09/28/2022 08:39 AM    TPOABS 475.8 (H) 11/05/2018 12:04 PM     PAST MEDICAL HISTORY   Past Medical History:   Diagnosis Date    Abnormal Pap smear of cervix     ALT (SGPT) level raised     Anemia     Anxiety     Arthritis     back    Bronchitis     Complication of anesthesia     Depressive disorder, not elsewhere classified     Dietary surveillance and counseling     History of conization of cervix 3 times in [address] [place] [address]    Hx of conization of cervix complicating pregnancy 0225'M Õie 16    Hyperlipidemia     Hypothyroidism     Leukocytosis, unspecified     Nonspecific elevation of levels of transaminase or lactic acid dehydrogenase (LDH)     Other abnormal blood chemistry     Overactive bladder     Pneumonia        PAST SURGICAL HISTORY   Past Surgical History:   Procedure Laterality Date     SECTION      CHOLECYSTECTOMY      ECHO COMPLETE  2013         LAPAROSCOPY      NERVE BLOCK Left 16    lumbar left medial branch block #1    TONSILLECTOMY      TONSILLECTOMY AND ADENOIDECTOMY      TUBAL LIGATION         SOCIAL HISTORY   Tobacco:   reports that she has quit smoking. Her smoking use included cigarettes. She has a 15.00 pack-year smoking history. She has never used smokeless tobacco.  Alcohol:   reports no history of alcohol use. Drugs:   reports no history of drug use.     FAMILY HISTORY   Family History   Problem Relation Age of Onset    Cancer Mother     Stroke Mother     High Blood Pressure Mother     Other Mother     Heart Disease Mother     Breast Cancer Mother 72    Cancer Sister     Other Brother     Cancer Sister     Breast Cancer Maternal Grandmother     Breast Cancer Maternal Cousin        ALLERGIES AND DRUG REACTIONS   Allergies   Allergen Reactions    Penicillins        CURRENT MEDICATIONS   Current Outpatient Medications   Medication Sig Dispense Refill    levothyroxine (SYNTHROID) 150 MCG tablet Take 1 tablet by mouth daily 90 tablet 1    paliperidone (INVEGA) 6 MG extended release tablet 6 mg every morning       donepezil (ARICEPT) 10 MG tablet 10 mg nightly       traZODone (DESYREL) 100 MG tablet 100 mg nightly       benztropine (COGENTIN) 2 MG tablet 2 mg 2 times daily       divalproex (DEPAKOTE) 250 MG DR tablet 250 mg in the morning and at bedtime       divalproex (DEPAKOTE) 500 MG DR tablet 500 mg in the morning and at bedtime       BANOPHEN 25 MG capsule 25 mg       calcium carbonate 600 MG TABS tablet Take 1 tablet by mouth daily      docusate sodium (COLACE) 100 MG capsule Take 100 mg by mouth 2 times daily      ibuprofen (ADVIL;MOTRIN) 200 MG tablet Take 200 mg by mouth every 6 hours as needed for Pain      magnesium hydroxide (MILK OF MAGNESIA) 400 MG/5ML suspension Take by mouth daily as needed for Constipation      guaiFENesin (ROBITUSSIN) 100 MG/5ML syrup Take 200 mg by mouth 3 times daily as needed for Cough      ARIPiprazole (ABILIFY) 2 MG tablet Take 1 tablet by mouth daily 14 tablet 0    montelukast (SINGULAIR) 10 MG tablet 10 mg nightly       Loratadine 10 MG CAPS Take 10 mg by mouth daily as needed. No current facility-administered medications for this visit. Review of Systems  Constitutional: No fever, no chills, no diaphoresis, no generalized weakness. HEENT: No blurred vision, No sore throat, no ear pain, no hair loss  Neck: denied any neck swelling, difficulty swallowing,   Cadrdiopulomary: No CP, SOB or palpitation, No orthopnea or PND. No cough or wheezing. GI: No N/V/D, no constipation, No abdominal pain, no melena or hematochezia   : Denied any dysuria, hematuria, flank pain, discharge, or incontinence. Skin: denied any rash, ulcer, Hirsute, or hyperpigmentation. MSK: denied any joint deformity, joint pain/swelling, muscle pain, or back pain. Neuro: no numbess, no tingling, no weakness,     OBJECTIVE    BP (!) 96/55   Pulse 84   Resp 18   Ht 5' 9\" (1.753 m)   Wt 232 lb (105.2 kg)   SpO2 99%   BMI 34.26 kg/m²   BP Readings from Last 4 Encounters:   03/08/23 (!) 96/55   09/08/22 113/78   06/14/22 122/80   05/04/22 120/72     Wt Readings from Last 6 Encounters:   03/08/23 232 lb (105.2 kg)   09/08/22 261 lb (118.4 kg)   06/14/22 268 lb 9.6 oz (121.8 kg)   05/04/22 267 lb (121.1 kg)   05/30/21 242 lb (109.8 kg)   11/14/18 226 lb 9.6 oz (102.8 kg)       Physical examination:  General: awake alert, oriented x3, no abnormal position or movements.    HEENT: normocephalic non traumatic  Neck: supple, no LN enlargement, no thyromegaly, no thyroid tenderness, no JVD. Pulm: Clear equal air entry no added sounds, no wheezing or rhonchi    CVS: S1 + S2, no murmur, no heave. Dorsalis pedis pulse palpable   Abd: soft lax, no tenderness, no organomegaly, audible bowel sounds. Skin: warm, no lesions, no rash.   Musculoskeletal: No back tenderness, no kyphosis/scoliosis   Neuro: CN intact, sensation normal , muscle power normal.  Psych: normal mood, and affect    Review of Laboratory Data:  I have reviewed the following:  Lab Results   Component Value Date/Time    WBC 9.8 09/28/2022 08:39 AM    RBC 4.52 09/28/2022 08:39 AM    HGB 14.7 09/28/2022 08:39 AM    HCT 43.9 09/28/2022 08:39 AM    MCV 97.1 09/28/2022 08:39 AM    MCH 32.5 09/28/2022 08:39 AM    MCHC 33.5 09/28/2022 08:39 AM    RDW 12.3 09/28/2022 08:39 AM     09/28/2022 08:39 AM    MPV 9.7 09/28/2022 08:39 AM      Lab Results   Component Value Date/Time     09/28/2022 08:39 AM    K 4.7 09/28/2022 08:39 AM    CO2 24 09/28/2022 08:39 AM    BUN 6 09/28/2022 08:39 AM    CREATININE 0.7 09/28/2022 08:39 AM    CALCIUM 9.8 09/28/2022 08:39 AM    LABGLOM >60 09/28/2022 08:39 AM    GFRAA >60 09/28/2022 08:39 AM      Lab Results   Component Value Date/Time    TSH 3.730 09/28/2022 08:39 AM    T4FREE 1.45 09/08/2022 02:26 PM    L5KPPDD 7.9 09/28/2022 08:39 AM    K0FOGKA 82.96 09/28/2022 08:39 AM    TPOABS 475.8 (H) 11/05/2018 12:04 PM     Lab Results   Component Value Date/Time    LABA1C 5.6 09/28/2022 08:39 AM    GLUCOSE 89 09/28/2022 08:39 AM     Lab Results   Component Value Date/Time    TRIG 190 09/28/2022 08:39 AM    HDL 38 09/28/2022 08:39 AM    LDLCALC 145 09/28/2022 08:39 AM    CHOL 221 09/28/2022 08:39 AM     Lab Results   Component Value Date/Time    VITD25 32 09/28/2022 08:39 AM    VITD25 29 06/14/2022 10:33 AM       ASSESSMENT & RECOMMENDATIONS   Selin Rader, a 50 y.o.-old female seen in for following issues     Primary hypothyroidism   Currently on synthroid 150 mcg daily   Check level today   The pt was advised to take Synthroid  in the morning at empty stomach, wait one hour before eating , avoid multivitamins containing calcium  or iron with it. Check TFT in few weeks     vitD deficiency   Continue vitD supplement     I personally reviewed external notes from PCP and other patient's care team providers, and personally interpreted labs associated with the above diagnosis. I also ordered labs to further assess and manage the above addressed medical conditions. Return in about 6 months (around 9/8/2023) for hypothyroidism, VitD deficiency. The above issues were reviewed with the patient who understood and agreed with the plan. Thank you for allowing us to participate in the care of this patient. Please do not hesitate to contact us with any additional questions. Diagnosis Orders   1. Hypothyroidism, unspecified type  TSH    T4, Free      2. Vitamin D deficiency        3. Chronic fatigue  TSH    T4, Free    Cortisol Total      4. Hypotension, unspecified hypotension type  ACTH    Cortisol Total          Leila Teague MD  Endocrinologist, [facility] - [facility] Diabetes Care and Endocrinology   [address] [facility] [address]   Phone: [phone]  Fax: [phone]  ------------------------------  An electronic signature was used to authenticate this note.  Alana Benavides MD on 3/8/2023 at 1:45 PM

## 2023-03-12 ENCOUNTER — TELEPHONE (OUTPATIENT)
Dept: ENDOCRINOLOGY | Age: 53
End: 2023-03-12

## 2023-03-12 LAB — ADRENOCORTICOTROPIC HORMONE: 26.4 PG/ML (ref 7.2–63.3)

## 2023-03-12 NOTE — TELEPHONE ENCOUNTER
Endocrine staff,   Please notify pt that I have reviewed your recent results.      Thyroid level was slightly above goal. I recommend slightly adjusting synthroid from 150 mcg  1 tab daily to 1 tab 6 days a wk and 0.5 tab on Sunday    Lab 6-8 wks

## 2023-03-15 ENCOUNTER — TELEPHONE (OUTPATIENT)
Dept: SLEEP CENTER | Age: 53
End: 2023-03-15

## 2023-03-15 ENCOUNTER — HOSPITAL ENCOUNTER (OUTPATIENT)
Dept: SLEEP CENTER | Age: 53
Discharge: HOME | End: 2023-03-15
Payer: COMMERCIAL

## 2023-03-15 DIAGNOSIS — G47.33 OSA (OBSTRUCTIVE SLEEP APNEA): Primary | ICD-10-CM

## 2023-03-15 PROCEDURE — 95810 POLYSOM 6/> YRS 4/> PARAM: CPT

## 2023-04-19 ENCOUNTER — TELEPHONE (OUTPATIENT)
Dept: SLEEP CENTER | Age: 53
End: 2023-04-19

## 2023-04-25 ENCOUNTER — HOSPITAL ENCOUNTER (OUTPATIENT)
Dept: MAMMOGRAPHY | Age: 53
Discharge: HOME | End: 2023-04-27
Payer: COMMERCIAL

## 2023-04-25 VITALS — WEIGHT: 223 LBS | HEIGHT: 69 IN | BODY MASS INDEX: 33.03 KG/M2

## 2023-04-25 DIAGNOSIS — Z12.31 ENCOUNTER FOR SCREENING MAMMOGRAM FOR MALIGNANT NEOPLASM OF BREAST: ICD-10-CM

## 2023-04-25 PROCEDURE — 77063 BREAST TOMOSYNTHESIS BI: CPT

## 2023-04-28 DIAGNOSIS — E03.9 HYPOTHYROIDISM, UNSPECIFIED TYPE: ICD-10-CM

## 2023-04-28 RX ORDER — LEVOTHYROXINE SODIUM 0.15 MG/1
150 TABLET ORAL DAILY
Qty: 90 TABLET | Refills: 1 | Status: SHIPPED | OUTPATIENT
Start: 2023-04-28

## 2023-05-01 DIAGNOSIS — E03.9 HYPOTHYROIDISM, UNSPECIFIED TYPE: ICD-10-CM

## 2023-05-01 RX ORDER — LEVOTHYROXINE SODIUM 0.15 MG/1
150 TABLET ORAL DAILY
Qty: 90 TABLET | Refills: 1 | Status: SHIPPED | OUTPATIENT
Start: 2023-05-01

## 2023-05-12 ENCOUNTER — OFFICE VISIT (OUTPATIENT)
Dept: ONCOLOGY | Age: 53
End: 2023-05-12
Payer: COMMERCIAL

## 2023-05-12 ENCOUNTER — HOSPITAL ENCOUNTER (OUTPATIENT)
Dept: INFUSION THERAPY | Age: 53
Discharge: HOME | End: 2023-05-12
Payer: COMMERCIAL

## 2023-05-12 VITALS
DIASTOLIC BLOOD PRESSURE: 79 MMHG | TEMPERATURE: 98.5 F | WEIGHT: 225.1 LBS | OXYGEN SATURATION: 97 % | HEIGHT: 69 IN | BODY MASS INDEX: 33.34 KG/M2 | SYSTOLIC BLOOD PRESSURE: 111 MMHG | HEART RATE: 91 BPM

## 2023-05-12 DIAGNOSIS — R63.4 WEIGHT LOSS: ICD-10-CM

## 2023-05-12 DIAGNOSIS — R61 NIGHT SWEATS: ICD-10-CM

## 2023-05-12 DIAGNOSIS — D72.829 LEUKOCYTOSIS, UNSPECIFIED TYPE: Primary | ICD-10-CM

## 2023-05-12 DIAGNOSIS — D72.829 LEUKOCYTOSIS, UNSPECIFIED TYPE: ICD-10-CM

## 2023-05-12 LAB
ALBUMIN SERPL-MCNC: 3.7 G/DL (ref 3.5–5.2)
ALP SERPL-CCNC: 93 U/L (ref 35–104)
ALT SERPL-CCNC: 19 U/L (ref 0–32)
ANION GAP SERPL CALCULATED.3IONS-SCNC: 13 MMOL/L (ref 7–16)
AST SERPL-CCNC: 16 U/L (ref 0–31)
BASOPHILS # BLD: 0.08 E9/L (ref 0–0.2)
BASOPHILS NFR BLD: 0.5 % (ref 0–2)
BILIRUB SERPL-MCNC: 0.3 MG/DL (ref 0–1.2)
BUN SERPL-MCNC: 7 MG/DL (ref 6–20)
CALCIUM SERPL-MCNC: 9.2 MG/DL (ref 8.6–10.2)
CHLORIDE SERPL-SCNC: 103 MMOL/L (ref 98–107)
CO2 SERPL-SCNC: 22 MMOL/L (ref 22–29)
CREAT SERPL-MCNC: 0.5 MG/DL (ref 0.5–1)
EOSINOPHIL # BLD: 0.07 E9/L (ref 0.05–0.5)
EOSINOPHIL NFR BLD: 0.4 % (ref 0–6)
ERYTHROCYTE [DISTWIDTH] IN BLOOD BY AUTOMATED COUNT: 14.5 FL (ref 11.5–15)
GLUCOSE SERPL-MCNC: 105 MG/DL (ref 74–99)
HCT VFR BLD AUTO: 48.1 % (ref 34–48)
HGB BLD-MCNC: 16.1 G/DL (ref 11.5–15.5)
IMM GRANULOCYTES # BLD: 0.07 E9/L
IMM GRANULOCYTES NFR BLD: 0.4 % (ref 0–5)
LDH SERPL-CCNC: 146 U/L (ref 135–214)
LYMPHOCYTES # BLD: 3.45 E9/L (ref 1.5–4)
LYMPHOCYTES NFR BLD: 21.7 % (ref 20–42)
MCH RBC QN AUTO: 31.1 PG (ref 26–35)
MCHC RBC AUTO-ENTMCNC: 33.5 % (ref 32–34.5)
MCV RBC AUTO: 92.9 FL (ref 80–99.9)
MONOCYTES # BLD: 0.79 E9/L (ref 0.1–0.95)
MONOCYTES NFR BLD: 5 % (ref 2–12)
NEUTROPHILS # BLD: 11.47 E9/L (ref 1.8–7.3)
NEUTS SEG NFR BLD: 72 % (ref 43–80)
PLATELET # BLD AUTO: 446 E9/L (ref 130–450)
PMV BLD AUTO: 9.2 FL (ref 7–12)
POTASSIUM SERPL-SCNC: 4 MMOL/L (ref 3.5–5)
PROT SERPL-MCNC: 7.5 G/DL (ref 6.4–8.3)
RBC # BLD AUTO: 5.18 E12/L (ref 3.5–5.5)
SODIUM SERPL-SCNC: 138 MMOL/L (ref 132–146)
WBC # BLD: 15.9 E9/L (ref 4.5–11.5)

## 2023-05-12 PROCEDURE — 99204 OFFICE O/P NEW MOD 45 MIN: CPT | Performed by: STUDENT IN AN ORGANIZED HEALTH CARE EDUCATION/TRAINING PROGRAM

## 2023-05-12 PROCEDURE — 85540 WBC ALKALINE PHOSPHATASE: CPT

## 2023-05-12 PROCEDURE — G8427 DOCREV CUR MEDS BY ELIG CLIN: HCPCS | Performed by: STUDENT IN AN ORGANIZED HEALTH CARE EDUCATION/TRAINING PROGRAM

## 2023-05-12 PROCEDURE — G8417 CALC BMI ABV UP PARAM F/U: HCPCS | Performed by: STUDENT IN AN ORGANIZED HEALTH CARE EDUCATION/TRAINING PROGRAM

## 2023-05-12 PROCEDURE — 4004F PT TOBACCO SCREEN RCVD TLK: CPT | Performed by: STUDENT IN AN ORGANIZED HEALTH CARE EDUCATION/TRAINING PROGRAM

## 2023-05-12 PROCEDURE — 36415 COLL VENOUS BLD VENIPUNCTURE: CPT

## 2023-05-12 PROCEDURE — 85025 COMPLETE CBC W/AUTO DIFF WBC: CPT

## 2023-05-12 PROCEDURE — 3017F COLORECTAL CA SCREEN DOC REV: CPT | Performed by: STUDENT IN AN ORGANIZED HEALTH CARE EDUCATION/TRAINING PROGRAM

## 2023-05-12 PROCEDURE — 81270 JAK2 GENE: CPT

## 2023-05-12 PROCEDURE — 80053 COMPREHEN METABOLIC PANEL: CPT

## 2023-05-12 PROCEDURE — 83615 LACTATE (LD) (LDH) ENZYME: CPT

## 2023-05-12 RX ORDER — ZOLPIDEM TARTRATE 10 MG/1
TABLET ORAL NIGHTLY PRN
COMMUNITY

## 2023-05-12 ASSESSMENT — ENCOUNTER SYMPTOMS
RESPIRATORY NEGATIVE: 1
GASTROINTESTINAL NEGATIVE: 1

## 2023-05-12 NOTE — PROGRESS NOTES
[id number] [facility]  [place] [address]  Dept: Mickey: [phone]  Clinic Consultation Note    Referring Provider:  CARLOS ENRIQUE Benjamin CNP    Reason for Visit:   Leukocytosis    PCP:  CARLOS ENRIQUE Benjamin CNP    Demographics: 50 y.o. female    Chief Complaint:   Chief Complaint   Patient presents with    New Patient       History of Present Illness (5/12/23): The patient is a 50 y.o. female who comes in after being referred by her PCP for leukocytosis. The patient reports that she has been aware of having elevated WBC \"her whole life\". She also reported starting smoking around the age of 10. Within the last few months, the patient reported having night sweats. She has also had an approximate 40 pound weight loss over the last year. And around that time, she has had significantly decreased appetite, in which she has noticed decrease in her caloric intake. According to PCP records, patient's WBC reached up to 15 back in 4/7/2023. There is no differential on CBC. Previous CBCs over the last 8 years noted intermittent leukocytosis, reaching up to 14.1. Based on her records, last normal WBC was back in 9/28/2022. Absolute neutrophil and lymphocyte counts were within normal limits. She also mentioned having issues with amenorrhea, not having a menstrual cycle over the last 2 years. And being told by her OBG when laboratory parameters are not suggestive of menopause. Due to the patient, she underwent extensive work-up which was largely unremarkable, and was recommended medical methods to restart her menstrual cycles, which the patient declined. She also noted being largely up-to-date with her cancer screenings, colonoscopy 6 years ago, Pap smear a few weeks ago, and mammogram only a month ago. Review of Systems;   Review of Systems   Constitutional:  Positive for unexpected weight
(walker, cane, off-loading devices), attached to equipment (IV pole, oxygen) No - 0     4. Sensory Limitations: dizziness, vertigo, impaired vision Yes - 3       5. Age Less than 65 years - 0       6. Medication: diuretics, strong analgesics, hypnotics, sedatives, antihypertensive agents   Yes - 3   7. Falls:  recent history of falls within the last 3 months (not to include slipping or tripping)   No - 0   TOTAL 6    If score of 4 or greater was education given? Yes       TABLE 2   Risk Score Risk Level Plan of Care   0-3 Little or  No Risk 1. Provide assistance as indicated for ambulation activities  2. Reorient confused/cognitively impaired patient  3. Call-light/bell within patient's reach  4. Chair/bed in low position, stretcher/bed with siderails up except when performing patient care activities  5. Educate patient/family/caregiver on falls prevention  6.  Reassess in 12 weeks or with any noted change in patient condition which places them at a risk for a fall   4-6 Moderate Risk 1. Provide assistance as indicated for ambulation activities  2. Reorient confused/cognitively impaired patient  3. Call-light/bell within patient's reach  4. Chair/bed in low position, stretcher/bed with siderails up except when performing patient care activities  5. Educate patient/family/caregiver on falls prevention  6. Falls risk precaution (Yellow sticker Level II) placed on patient chart   7 or   Higher High Risk 1. Place patient in easily observable treatment room  2. Patient attended at all times by family member or staff  3. Provide assistance as indicated for ambulation activities  4. Reorient confused/cognitively impaired patient  5. Call-light/bell within patient's reach  6. Chair/bed in low position, stretcher/bed with siderails up except when performing patient care activities  7. Educate patient/family/caregiver on falls prevention  8.   Falls risk precaution (Yellow sticker Level III) placed on

## 2023-05-13 LAB
Lab: NORMAL
THIS TEST SENT TO: NORMAL

## 2023-05-15 LAB — PATHOLOGIST REVIEW: NORMAL

## 2023-05-17 ENCOUNTER — HOSPITAL ENCOUNTER (OUTPATIENT)
Dept: SLEEP CENTER | Age: 53
Discharge: HOME | End: 2023-05-17
Payer: COMMERCIAL

## 2023-05-17 DIAGNOSIS — G47.33 OSA (OBSTRUCTIVE SLEEP APNEA): Primary | ICD-10-CM

## 2023-05-17 LAB
Lab: NORMAL
Lab: NORMAL
REPORT: NORMAL
REPORT: NORMAL
THIS TEST SENT TO: NORMAL
THIS TEST SENT TO: NORMAL

## 2023-05-17 PROCEDURE — 95811 POLYSOM 6/>YRS CPAP 4/> PARM: CPT

## 2023-05-22 LAB
Lab: NORMAL
REPORT: NORMAL
THIS TEST SENT TO: NORMAL

## 2023-05-26 LAB
Lab: NORMAL
REPORT: NORMAL
THIS TEST SENT TO: NORMAL

## 2023-06-09 ENCOUNTER — OFFICE VISIT (OUTPATIENT)
Dept: ONCOLOGY | Age: 53
End: 2023-06-09
Payer: MEDICARE

## 2023-06-09 VITALS
TEMPERATURE: 98.1 F | HEART RATE: 122 BPM | HEIGHT: 69 IN | OXYGEN SATURATION: 96 % | WEIGHT: 211.5 LBS | BODY MASS INDEX: 31.32 KG/M2

## 2023-06-09 DIAGNOSIS — D72.829 LEUKOCYTOSIS, UNSPECIFIED TYPE: ICD-10-CM

## 2023-06-09 DIAGNOSIS — R63.4 WEIGHT LOSS: ICD-10-CM

## 2023-06-09 DIAGNOSIS — R61 NIGHT SWEATS: Primary | ICD-10-CM

## 2023-06-09 PROCEDURE — 99214 OFFICE O/P EST MOD 30 MIN: CPT | Performed by: STUDENT IN AN ORGANIZED HEALTH CARE EDUCATION/TRAINING PROGRAM

## 2023-06-09 PROCEDURE — 3017F COLORECTAL CA SCREEN DOC REV: CPT | Performed by: STUDENT IN AN ORGANIZED HEALTH CARE EDUCATION/TRAINING PROGRAM

## 2023-06-09 PROCEDURE — G8427 DOCREV CUR MEDS BY ELIG CLIN: HCPCS | Performed by: STUDENT IN AN ORGANIZED HEALTH CARE EDUCATION/TRAINING PROGRAM

## 2023-06-09 PROCEDURE — 4004F PT TOBACCO SCREEN RCVD TLK: CPT | Performed by: STUDENT IN AN ORGANIZED HEALTH CARE EDUCATION/TRAINING PROGRAM

## 2023-06-09 PROCEDURE — G8417 CALC BMI ABV UP PARAM F/U: HCPCS | Performed by: STUDENT IN AN ORGANIZED HEALTH CARE EDUCATION/TRAINING PROGRAM

## 2023-06-09 ASSESSMENT — ENCOUNTER SYMPTOMS
GASTROINTESTINAL NEGATIVE: 1
RESPIRATORY NEGATIVE: 1

## 2023-06-09 NOTE — PROGRESS NOTES
[address] [facility] ONCOLOGY  [facility] [address]  Dept: Mickey: [phone]  Clinic Progress Note    Referring Provider:  CARLOS ENRIQUE Ren CNP    Reason for Visit:   Leukocytosis    PCP:  CARLOS ENRIQUE Ren CNP    Demographics: 50 y.o. female    Chief Complaint:   Chief Complaint   Patient presents with    Follow-up       Subjective:  Overall, patient doing well. She denies of new symptoms. Still has night sweats, ongoing for a at least a couple of months but not drenching, does not need to change sheets or clothes. Per chart review, appears to have lost more weight. Denies changes in diet. Also reports she stopped drinking 2 liters of pop last year, around the time of her weight loss. HPI from Initial Outpatient Consultation  (5/12/23): The patient is a 50 y.o. female who comes in after being referred by her PCP for leukocytosis. The patient reports that she has been aware of having elevated WBC \"her whole life\". She also reported starting smoking around the age of 10. Within the last few months, the patient reported having night sweats. She has also had an approximate 40 pound weight loss over the last year. And around that time, she has had significantly decreased appetite, in which she has noticed decrease in her caloric intake. According to PCP records, patient's WBC reached up to 15 back in 4/7/2023. There is no differential on CBC. Previous CBCs over the last 8 years noted intermittent leukocytosis, reaching up to 14.1. Based on her records, last normal WBC was back in 9/28/2022. Absolute neutrophil and lymphocyte counts were within normal limits. She also mentioned having issues with amenorrhea, not having a menstrual cycle over the last 2 years. And being told by her OBG when laboratory parameters are not suggestive of menopause.   Due to the patient, she underwent extensive

## 2023-07-11 ENCOUNTER — HOSPITAL ENCOUNTER (OUTPATIENT)
Dept: CT IMAGING | Age: 53
Discharge: HOME | End: 2023-07-11
Payer: MEDICARE

## 2023-07-11 DIAGNOSIS — R61 NIGHT SWEATS: ICD-10-CM

## 2023-07-11 DIAGNOSIS — R63.4 WEIGHT LOSS: ICD-10-CM

## 2023-07-11 DIAGNOSIS — D72.829 LEUKOCYTOSIS, UNSPECIFIED TYPE: ICD-10-CM

## 2023-07-11 PROCEDURE — 6360000004 HC RX CONTRAST MEDICATION: Performed by: RADIOLOGY

## 2023-07-11 PROCEDURE — 70491 CT SOFT TISSUE NECK W/DYE: CPT

## 2023-07-11 PROCEDURE — 71260 CT THORAX DX C+: CPT

## 2023-07-11 PROCEDURE — 74177 CT ABD & PELVIS W/CONTRAST: CPT

## 2023-07-11 RX ADMIN — IOPAMIDOL 18 ML: 755 INJECTION, SOLUTION INTRAVENOUS at 08:46

## 2023-07-11 RX ADMIN — IOPAMIDOL 75 ML: 755 INJECTION, SOLUTION INTRAVENOUS at 08:46

## 2023-07-14 ENCOUNTER — OFFICE VISIT (OUTPATIENT)
Dept: ONCOLOGY | Age: 53
End: 2023-07-14
Payer: MEDICARE

## 2023-07-14 ENCOUNTER — HOSPITAL ENCOUNTER (OUTPATIENT)
Dept: INFUSION THERAPY | Age: 53
Discharge: HOME | End: 2023-07-14
Payer: MEDICAID

## 2023-07-14 VITALS
TEMPERATURE: 97.9 F | DIASTOLIC BLOOD PRESSURE: 70 MMHG | BODY MASS INDEX: 31.04 KG/M2 | HEART RATE: 96 BPM | OXYGEN SATURATION: 97 % | SYSTOLIC BLOOD PRESSURE: 110 MMHG | HEIGHT: 69 IN | WEIGHT: 209.6 LBS

## 2023-07-14 DIAGNOSIS — R63.4 WEIGHT LOSS: ICD-10-CM

## 2023-07-14 DIAGNOSIS — D72.829 LEUKOCYTOSIS, UNSPECIFIED TYPE: Primary | ICD-10-CM

## 2023-07-14 DIAGNOSIS — R61 NIGHT SWEATS: ICD-10-CM

## 2023-07-14 DIAGNOSIS — D72.829 LEUKOCYTOSIS, UNSPECIFIED TYPE: ICD-10-CM

## 2023-07-14 LAB
BASOPHILS # BLD: 0.06 E9/L (ref 0–0.2)
BASOPHILS NFR BLD: 0.5 % (ref 0–2)
EOSINOPHIL # BLD: 0.12 E9/L (ref 0.05–0.5)
EOSINOPHIL NFR BLD: 0.9 % (ref 0–6)
ERYTHROCYTE [DISTWIDTH] IN BLOOD BY AUTOMATED COUNT: 14 FL (ref 11.5–15)
HCT VFR BLD AUTO: 49.9 % (ref 34–48)
HGB BLD-MCNC: 16.6 G/DL (ref 11.5–15.5)
IMM GRANULOCYTES # BLD: 0.1 E9/L
IMM GRANULOCYTES NFR BLD: 0.8 % (ref 0–5)
LYMPHOCYTES # BLD: 3.51 E9/L (ref 1.5–4)
LYMPHOCYTES NFR BLD: 27.3 % (ref 20–42)
MCH RBC QN AUTO: 30.9 PG (ref 26–35)
MCHC RBC AUTO-ENTMCNC: 33.3 % (ref 32–34.5)
MCV RBC AUTO: 92.8 FL (ref 80–99.9)
MONOCYTES # BLD: 0.66 E9/L (ref 0.1–0.95)
MONOCYTES NFR BLD: 5.1 % (ref 2–12)
NEUTROPHILS # BLD: 8.43 E9/L (ref 1.8–7.3)
NEUTS SEG NFR BLD: 65.4 % (ref 43–80)
PLATELET # BLD AUTO: 362 E9/L (ref 130–450)
PMV BLD AUTO: 8.8 FL (ref 7–12)
RBC # BLD AUTO: 5.38 E12/L (ref 3.5–5.5)
WBC # BLD: 12.9 E9/L (ref 4.5–11.5)

## 2023-07-14 PROCEDURE — 36415 COLL VENOUS BLD VENIPUNCTURE: CPT

## 2023-07-14 PROCEDURE — 85025 COMPLETE CBC W/AUTO DIFF WBC: CPT

## 2023-07-14 PROCEDURE — G8417 CALC BMI ABV UP PARAM F/U: HCPCS | Performed by: STUDENT IN AN ORGANIZED HEALTH CARE EDUCATION/TRAINING PROGRAM

## 2023-07-14 PROCEDURE — 3017F COLORECTAL CA SCREEN DOC REV: CPT | Performed by: STUDENT IN AN ORGANIZED HEALTH CARE EDUCATION/TRAINING PROGRAM

## 2023-07-14 PROCEDURE — 99212 OFFICE O/P EST SF 10 MIN: CPT

## 2023-07-14 PROCEDURE — G8427 DOCREV CUR MEDS BY ELIG CLIN: HCPCS | Performed by: STUDENT IN AN ORGANIZED HEALTH CARE EDUCATION/TRAINING PROGRAM

## 2023-07-14 PROCEDURE — 99214 OFFICE O/P EST MOD 30 MIN: CPT | Performed by: STUDENT IN AN ORGANIZED HEALTH CARE EDUCATION/TRAINING PROGRAM

## 2023-07-14 PROCEDURE — 4004F PT TOBACCO SCREEN RCVD TLK: CPT | Performed by: STUDENT IN AN ORGANIZED HEALTH CARE EDUCATION/TRAINING PROGRAM

## 2023-07-14 ASSESSMENT — ENCOUNTER SYMPTOMS
GASTROINTESTINAL NEGATIVE: 1
RESPIRATORY NEGATIVE: 1

## 2023-07-14 NOTE — PROGRESS NOTES
[address] [facility] ONCOLOGY  [address] [place] [address]  Dept: [address]: [phone]  Clinic Progress Note    Referring Provider:  CARLOS ENRIQUE Pires CNP    Reason for Visit:   Leukocytosis    PCP:  CARLOS ENRIQUE Pires CNP    Demographics: 50 y.o. female    Chief Complaint:   Chief Complaint   Patient presents with    Follow-up     leukocytosis       Subjective:  Patient overall doing well. No major events. She states she continues to lose weight  Also continues to have night sweats. We reviewed results today. HPI from Initial Outpatient Consultation  (5/12/23): The patient is a 50 y.o. female who comes in after being referred by her PCP for leukocytosis. The patient reports that she has been aware of having elevated WBC \"her whole life\". She also reported starting smoking around the age of 10. Within the last few months, the patient reported having night sweats. She has also had an approximate 40 pound weight loss over the last year. And around that time, she has had significantly decreased appetite, in which she has noticed decrease in her caloric intake. According to PCP records, patient's WBC reached up to 15 back in 4/7/2023. There is no differential on CBC. Previous CBCs over the last 8 years noted intermittent leukocytosis, reaching up to 14.1. Based on her records, last normal WBC was back in 9/28/2022. Absolute neutrophil and lymphocyte counts were within normal limits. She also mentioned having issues with amenorrhea, not having a menstrual cycle over the last 2 years. And being told by her OBG when laboratory parameters are not suggestive of menopause. Due to the patient, she underwent extensive work-up which was largely unremarkable, and was recommended medical methods to restart her menstrual cycles, which the patient declined.     She also noted being largely up-to-date with her cancer See scanned implantable loop recorder report in chart. Chargeable visit.

## 2023-07-25 ENCOUNTER — OFFICE VISIT (OUTPATIENT)
Dept: SLEEP CENTER | Age: 53
End: 2023-07-25
Payer: MEDICARE

## 2023-07-25 VITALS
DIASTOLIC BLOOD PRESSURE: 71 MMHG | HEART RATE: 118 BPM | WEIGHT: 210.1 LBS | RESPIRATION RATE: 16 BRPM | BODY MASS INDEX: 31.12 KG/M2 | HEIGHT: 69 IN | OXYGEN SATURATION: 97 % | SYSTOLIC BLOOD PRESSURE: 100 MMHG

## 2023-07-25 DIAGNOSIS — G47.33 OBSTRUCTIVE SLEEP APNEA: Primary | ICD-10-CM

## 2023-07-25 DIAGNOSIS — G47.00 INSOMNIA, UNSPECIFIED TYPE: ICD-10-CM

## 2023-07-25 DIAGNOSIS — E66.9 OBESITY, UNSPECIFIED CLASSIFICATION, UNSPECIFIED OBESITY TYPE, UNSPECIFIED WHETHER SERIOUS COMORBIDITY PRESENT: ICD-10-CM

## 2023-07-25 PROCEDURE — G8427 DOCREV CUR MEDS BY ELIG CLIN: HCPCS | Performed by: NURSE PRACTITIONER

## 2023-07-25 PROCEDURE — 99203 OFFICE O/P NEW LOW 30 MIN: CPT | Performed by: NURSE PRACTITIONER

## 2023-07-25 PROCEDURE — G8417 CALC BMI ABV UP PARAM F/U: HCPCS | Performed by: NURSE PRACTITIONER

## 2023-07-25 ASSESSMENT — SLEEP AND FATIGUE QUESTIONNAIRES
HOW LIKELY ARE YOU TO NOD OFF OR FALL ASLEEP WHILE WATCHING TV: 0
ESS TOTAL SCORE: 0
HOW LIKELY ARE YOU TO NOD OFF OR FALL ASLEEP IN A CAR, WHILE STOPPED FOR A FEW MINUTES IN TRAFFIC: 0
HOW LIKELY ARE YOU TO NOD OFF OR FALL ASLEEP WHILE SITTING AND READING: 0
HOW LIKELY ARE YOU TO NOD OFF OR FALL ASLEEP WHILE SITTING AND TALKING TO SOMEONE: 0
HOW LIKELY ARE YOU TO NOD OFF OR FALL ASLEEP WHILE LYING DOWN TO REST IN THE AFTERNOON WHEN CIRCUMSTANCES PERMIT: 0
HOW LIKELY ARE YOU TO NOD OFF OR FALL ASLEEP WHILE SITTING INACTIVE IN A PUBLIC PLACE: 0
HOW LIKELY ARE YOU TO NOD OFF OR FALL ASLEEP WHILE SITTING QUIETLY AFTER LUNCH WITHOUT ALCOHOL: 0
HOW LIKELY ARE YOU TO NOD OFF OR FALL ASLEEP WHEN YOU ARE A PASSENGER IN A CAR FOR AN HOUR WITHOUT A BREAK: 0

## 2023-07-25 NOTE — PROGRESS NOTES
[facility] Sleep Medicine    Patient Name: Kin Parks  Age: 50 y.o.   : 1970    Date of Visit: 23      HPI   Kin Parks is a 50 y.o. female with  has a past medical history of Abnormal Pap smear of cervix, ALT (SGPT) level raised, Anemia, Anxiety, Arthritis, Bronchitis, Complication of anesthesia, Depressive disorder, not elsewhere classified, Dietary surveillance and counseling, History of conization of cervix (3 times in [place]), conization of cervix complicating pregnancy (5015'N), Hyperlipidemia, Hypothyroidism, Leukocytosis, unspecified, Nonspecific elevation of levels of transaminase or lactic acid dehydrogenase (LDH), Other abnormal blood chemistry, Overactive bladder, and Pneumonia. She presents as a new patient to Sleep Clinic, referred by Elvin Frankel CNP, for Sleep Apnea   . Patient presents with her cousin to review sleep study results from PSG and titration in March and May of this year. Patient was ordered sleep studies through primary care provider due to ongoing sleep issues, nighttime awakenings, snoring, and poor sleep quality. Despite ongoing insomnia over the course of the last 1-2 years, patient does not feel tired or unrested during the day. Patient was previously diagnosed with sleep apnea 10 years ago and only was able to use CPAP for several months. She began Ambien, 10 mg through her psychiatrist several months ago following an mental health exacerbation. She has a history of bipolar disorder and states she does not sleep without the medication. Reports no parasomnias with medication. Does not nap through the day. She admits she does not exert a lot of energy during the day as well, sedentary through most of the day. Sleeps around 6 hours at night, on her side. She wakes up once through the night to use the restroom. Patient has unintentionally lost about 50 pounds over the last 1-2 years. She has discussed this with her PCP.  Smokes a pack a

## 2023-09-11 ENCOUNTER — OFFICE VISIT (OUTPATIENT)
Dept: ENDOCRINOLOGY | Age: 53
End: 2023-09-11
Payer: MEDICARE

## 2023-09-11 VITALS
HEART RATE: 57 BPM | OXYGEN SATURATION: 99 % | SYSTOLIC BLOOD PRESSURE: 97 MMHG | WEIGHT: 215 LBS | HEIGHT: 69 IN | BODY MASS INDEX: 31.84 KG/M2 | DIASTOLIC BLOOD PRESSURE: 67 MMHG

## 2023-09-11 DIAGNOSIS — E55.9 VITAMIN D DEFICIENCY: ICD-10-CM

## 2023-09-11 DIAGNOSIS — E03.9 HYPOTHYROIDISM, UNSPECIFIED TYPE: Primary | ICD-10-CM

## 2023-09-11 PROCEDURE — G8428 CUR MEDS NOT DOCUMENT: HCPCS | Performed by: INTERNAL MEDICINE

## 2023-09-11 PROCEDURE — 4004F PT TOBACCO SCREEN RCVD TLK: CPT | Performed by: INTERNAL MEDICINE

## 2023-09-11 PROCEDURE — G8417 CALC BMI ABV UP PARAM F/U: HCPCS | Performed by: INTERNAL MEDICINE

## 2023-09-11 PROCEDURE — 3017F COLORECTAL CA SCREEN DOC REV: CPT | Performed by: INTERNAL MEDICINE

## 2023-09-11 PROCEDURE — 99214 OFFICE O/P EST MOD 30 MIN: CPT | Performed by: INTERNAL MEDICINE

## 2023-09-11 RX ORDER — LEVOTHYROXINE SODIUM 112 UG/1
TABLET ORAL
Qty: 30 TABLET | Refills: 7 | Status: SHIPPED | OUTPATIENT
Start: 2023-09-11

## 2023-09-11 RX ORDER — PROPRANOLOL HYDROCHLORIDE 10 MG/1
TABLET ORAL
COMMUNITY
Start: 2023-08-14

## 2023-09-11 RX ORDER — LEVOTHYROXINE SODIUM 112 UG/1
TABLET ORAL
COMMUNITY
Start: 2023-08-16 | End: 2023-09-11 | Stop reason: SDUPTHER

## 2023-09-11 NOTE — PROGRESS NOTES
[address] [facility] Department of Endocrinology Diabetes and Metabolism   [facility] [facility] [address]   Phone: [phone]  Fax: [phone]    Date of Service: 9/11/2023  Primary Care Physician: CARLOS ENRIQUE Cyr - CNP  Provider: Veronica Bobo MD        Reason for the visit:  Primary Hypothyroidism    History of Present Illness: The history is provided by the patient. No  was used. Accuracy of the patient data is excellent. Graciela Rollins is a very pleasant 50 y.o. female seen today for management of hypothyroidism     The patient was diagnosed with hypothyroidism 15 years ago  and since then has been on thyroid hormones replacement. The patient is currently on Levothyroxine 112 mcg daily. Patient takes levothyroxine in the morning at empty stomach, wait one hour before eating , avoid multivitamins containing calcium  or iron with it.     Dose was 150 to 112 mcg daily     Due for labs     For the past few years, the patient has been struggling to keep thyroid level in normal range     Lab Results   Component Value Date/Time    TSH 0.330 03/08/2023 01:58 PM    T4FREE 1.73 (H) 03/08/2023 01:58 PM    I8XSYAQ 7.9 09/28/2022 08:39 AM    Q4VHQGY 82.96 09/28/2022 08:39 AM    TPOABS 475.8 (H) 11/05/2018 12:04 PM     She is not taking any Ca, iron or MV    Lab Results   Component Value Date/Time    TSH 0.330 03/08/2023 01:58 PM    T4FREE 1.73 (H) 03/08/2023 01:58 PM    D0WMCHK 7.9 09/28/2022 08:39 AM    U9UNXGP 82.96 09/28/2022 08:39 AM    TPOABS 475.8 (H) 11/05/2018 12:04 PM     PAST MEDICAL HISTORY   Past Medical History:   Diagnosis Date    Abnormal Pap smear of cervix     ALT (SGPT) level raised     Anemia     Anxiety     Arthritis     back    Bronchitis     Complication of anesthesia     Depressive disorder, not elsewhere classified     Dietary surveillance and counseling     History of conization of cervix 3 times in [facility]    Hx of conization of

## 2023-09-13 ENCOUNTER — HOSPITAL ENCOUNTER (OUTPATIENT)
Age: 53
Discharge: HOME | End: 2023-09-13
Payer: MEDICARE

## 2023-09-13 DIAGNOSIS — E03.9 HYPOTHYROIDISM, UNSPECIFIED TYPE: ICD-10-CM

## 2023-09-13 LAB
T4 FREE SERPL-MCNC: 0.9 NG/DL (ref 0.9–1.7)
TSH SERPL DL<=0.05 MIU/L-ACNC: 3.81 UIU/ML (ref 0.27–4.2)

## 2023-09-13 PROCEDURE — 36415 COLL VENOUS BLD VENIPUNCTURE: CPT

## 2023-09-13 PROCEDURE — 84443 ASSAY THYROID STIM HORMONE: CPT

## 2023-09-13 PROCEDURE — 84439 ASSAY OF FREE THYROXINE: CPT

## 2023-09-19 ENCOUNTER — TELEPHONE (OUTPATIENT)
Dept: ENDOCRINOLOGY | Age: 53
End: 2023-09-19

## 2023-11-02 ENCOUNTER — OFFICE VISIT (OUTPATIENT)
Dept: SLEEP CENTER | Age: 53
End: 2023-11-02
Payer: MEDICARE

## 2023-11-02 VITALS
DIASTOLIC BLOOD PRESSURE: 78 MMHG | SYSTOLIC BLOOD PRESSURE: 119 MMHG | HEART RATE: 85 BPM | BODY MASS INDEX: 33.89 KG/M2 | WEIGHT: 228.84 LBS | HEIGHT: 69 IN | RESPIRATION RATE: 16 BRPM | OXYGEN SATURATION: 96 %

## 2023-11-02 DIAGNOSIS — G47.33 OBSTRUCTIVE SLEEP APNEA: Primary | ICD-10-CM

## 2023-11-02 DIAGNOSIS — E66.9 OBESITY, UNSPECIFIED CLASSIFICATION, UNSPECIFIED OBESITY TYPE, UNSPECIFIED WHETHER SERIOUS COMORBIDITY PRESENT: ICD-10-CM

## 2023-11-02 DIAGNOSIS — G47.00 INSOMNIA, UNSPECIFIED TYPE: ICD-10-CM

## 2023-11-02 PROCEDURE — 99214 OFFICE O/P EST MOD 30 MIN: CPT | Performed by: NURSE PRACTITIONER

## 2023-11-02 ASSESSMENT — SLEEP AND FATIGUE QUESTIONNAIRES
HOW LIKELY ARE YOU TO NOD OFF OR FALL ASLEEP WHILE SITTING INACTIVE IN A PUBLIC PLACE: 0
HOW LIKELY ARE YOU TO NOD OFF OR FALL ASLEEP IN A CAR, WHILE STOPPED FOR A FEW MINUTES IN TRAFFIC: 0
HOW LIKELY ARE YOU TO NOD OFF OR FALL ASLEEP WHEN YOU ARE A PASSENGER IN A CAR FOR AN HOUR WITHOUT A BREAK: 0
HOW LIKELY ARE YOU TO NOD OFF OR FALL ASLEEP WHILE SITTING QUIETLY AFTER LUNCH WITHOUT ALCOHOL: 0
HOW LIKELY ARE YOU TO NOD OFF OR FALL ASLEEP WHILE SITTING AND READING: 0
HOW LIKELY ARE YOU TO NOD OFF OR FALL ASLEEP WHILE SITTING AND TALKING TO SOMEONE: 0
ESS TOTAL SCORE: 0
HOW LIKELY ARE YOU TO NOD OFF OR FALL ASLEEP WHILE LYING DOWN TO REST IN THE AFTERNOON WHEN CIRCUMSTANCES PERMIT: 0
HOW LIKELY ARE YOU TO NOD OFF OR FALL ASLEEP WHILE WATCHING TV: 0

## 2023-11-05 NOTE — PROGRESS NOTES
Maki Lyles     Patient presents for lack of menses. Patient had a normal FSH. She has infrequent menses. Thyroid was also within normal limits. Cyclical Provera was recommended, but she declined. No problems with her bowel or urinary function. No bloating cramping or abdominal pain. Not sexually active.           Past Medical History:   Diagnosis Date    Abnormal Pap smear of cervix     ALT (SGPT) level raised     Anemia     Anxiety     Arthritis     back    Bronchitis     Complication of anesthesia     Depressive disorder, not elsewhere classified     Dietary surveillance and counseling     History of conization of cervix 3 times in [place]    Hx of conization of cervix complicating pregnancy 5722'A    [facility]    Hyperlipidemia     Hypothyroidism     Leukocytosis, unspecified     Nonspecific elevation of levels of transaminase or lactic acid dehydrogenase (LDH)     Other abnormal blood chemistry     Overactive bladder     Pneumonia         Past Surgical History:   Procedure Laterality Date     SECTION      CHOLECYSTECTOMY      ECHO COMPLETE  2013         LAPAROSCOPY      NERVE BLOCK Left 16    lumbar left medial branch block #1    TONSILLECTOMY      TONSILLECTOMY AND ADENOIDECTOMY      TUBAL LIGATION          Family History   Problem Relation Age of Onset    Cancer Mother     Stroke Mother     High Blood Pressure Mother     Other Mother     Heart Disease Mother     Breast Cancer Mother 72    Cancer Sister     Cancer Sister     Other Brother     Breast Cancer Maternal Grandmother     Breast Cancer Maternal Cousin     Cancer Maternal Aunt         Only cancer on mammo Hx sheet        Social History       Tobacco History       Smoking Status  Every Day Smoking Frequency  1 pack/day for 30.00 years (30.00 ttl pk-yrs) Smoking Tobacco Type  Cigarettes      Smokeless Tobacco Use  Never              Alcohol History       Alcohol Use Status  No              Drug Use       Drug Use Status  No

## 2023-11-09 ENCOUNTER — OFFICE VISIT (OUTPATIENT)
Age: 53
End: 2023-11-09
Payer: MEDICARE

## 2023-11-09 VITALS
HEIGHT: 69 IN | SYSTOLIC BLOOD PRESSURE: 112 MMHG | BODY MASS INDEX: 33.92 KG/M2 | WEIGHT: 229 LBS | HEART RATE: 80 BPM | DIASTOLIC BLOOD PRESSURE: 81 MMHG

## 2023-11-09 DIAGNOSIS — N91.5 SCANTY OR INFREQUENT MENSTRUATION: Primary | ICD-10-CM

## 2023-11-09 PROCEDURE — G8484 FLU IMMUNIZE NO ADMIN: HCPCS | Performed by: LEGAL MEDICINE

## 2023-11-09 PROCEDURE — 3017F COLORECTAL CA SCREEN DOC REV: CPT | Performed by: LEGAL MEDICINE

## 2023-11-09 PROCEDURE — G8417 CALC BMI ABV UP PARAM F/U: HCPCS | Performed by: LEGAL MEDICINE

## 2023-11-09 PROCEDURE — 4004F PT TOBACCO SCREEN RCVD TLK: CPT | Performed by: LEGAL MEDICINE

## 2023-11-09 PROCEDURE — 99214 OFFICE O/P EST MOD 30 MIN: CPT | Performed by: LEGAL MEDICINE

## 2023-11-09 PROCEDURE — G8427 DOCREV CUR MEDS BY ELIG CLIN: HCPCS | Performed by: LEGAL MEDICINE

## 2023-11-09 SDOH — ECONOMIC STABILITY: HOUSING INSECURITY
IN THE LAST 12 MONTHS, WAS THERE A TIME WHEN YOU DID NOT HAVE A STEADY PLACE TO SLEEP OR SLEPT IN A SHELTER (INCLUDING NOW)?: NO

## 2023-11-09 SDOH — ECONOMIC STABILITY: FOOD INSECURITY: WITHIN THE PAST 12 MONTHS, THE FOOD YOU BOUGHT JUST DIDN'T LAST AND YOU DIDN'T HAVE MONEY TO GET MORE.: NEVER TRUE

## 2023-11-09 SDOH — ECONOMIC STABILITY: INCOME INSECURITY: HOW HARD IS IT FOR YOU TO PAY FOR THE VERY BASICS LIKE FOOD, HOUSING, MEDICAL CARE, AND HEATING?: NOT HARD AT ALL

## 2023-11-09 SDOH — ECONOMIC STABILITY: FOOD INSECURITY: WITHIN THE PAST 12 MONTHS, YOU WORRIED THAT YOUR FOOD WOULD RUN OUT BEFORE YOU GOT MONEY TO BUY MORE.: NEVER TRUE

## 2023-11-09 ASSESSMENT — PATIENT HEALTH QUESTIONNAIRE - PHQ9
SUM OF ALL RESPONSES TO PHQ QUESTIONS 1-9: 0
SUM OF ALL RESPONSES TO PHQ QUESTIONS 1-9: 0
SUM OF ALL RESPONSES TO PHQ9 QUESTIONS 1 & 2: 0
SUM OF ALL RESPONSES TO PHQ QUESTIONS 1-9: 0
1. LITTLE INTEREST OR PLEASURE IN DOING THINGS: 0
SUM OF ALL RESPONSES TO PHQ QUESTIONS 1-9: 0
2. FEELING DOWN, DEPRESSED OR HOPELESS: 0

## 2023-11-09 ASSESSMENT — ENCOUNTER SYMPTOMS
CONSTIPATION: 0
ABDOMINAL PAIN: 0
NAUSEA: 0
BLOOD IN STOOL: 0
RECTAL PAIN: 0
DIARRHEA: 0
ABDOMINAL DISTENTION: 0
VOMITING: 0

## 2023-11-27 ENCOUNTER — HOSPITAL ENCOUNTER (OUTPATIENT)
Dept: ULTRASOUND IMAGING | Age: 53
Discharge: HOME | End: 2023-11-27
Attending: LEGAL MEDICINE
Payer: COMMERCIAL

## 2023-11-27 DIAGNOSIS — N91.5 SCANTY OR INFREQUENT MENSTRUATION: ICD-10-CM

## 2023-11-27 PROCEDURE — 76856 US EXAM PELVIC COMPLETE: CPT

## 2023-11-29 NOTE — RESULT ENCOUNTER NOTE
Please call patient. 11/29/2023    Report shows normal pelvic ultrasound, but they were unable to see the left ovary due to overlying gas. Call if she has any abnormal bleeding or pain. Otherwise, keep her GYN appointment in 6 months.         Thank you

## 2023-11-30 ENCOUNTER — TELEPHONE (OUTPATIENT)
Age: 53
End: 2023-11-30

## 2023-11-30 ENCOUNTER — EVALUATION (OUTPATIENT)
Dept: SPEECH THERAPY | Age: 53
End: 2023-11-30
Payer: MEDICARE

## 2023-11-30 DIAGNOSIS — R41.841 COGNITIVE COMMUNICATION DEFICIT: Primary | ICD-10-CM

## 2023-11-30 PROCEDURE — 96125 COGNITIVE TEST BY HC PRO: CPT | Performed by: SPEECH-LANGUAGE PATHOLOGIST

## 2023-11-30 NOTE — TELEPHONE ENCOUNTER
----- Message from Bella Preston MD sent at 11/29/2023  9:58 AM EST -----  Please call patient. 11/29/2023    Report shows normal pelvic ultrasound, but they were unable to see the left ovary due to overlying gas. Call if she has any abnormal bleeding or pain. Otherwise, keep her GYN appointment in 6 months.         Thank you

## 2023-12-06 NOTE — PROGRESS NOTES
[address]  Outpatient Speech Therapy  Phone: [phone]   Fax:  [phone]    SPEECH/LANGUAGE PATHOLOGY  ROSS INFORMATION PROCESSING ASSESSMENT-2 (RIPA-2)   EVALUATION RESULTS and PLAN OF CARE    PATIENT NAME:  Sylvain Tim  (female)     MRN:  [id number]    :  1970  (48 y.o.)  STATUS:  Outpatient clinic   TODAY'S DATE:  2023  REFERRING PROVIDER:   Jesus Alberto Lopez, MSN, APRN-CNP   SPECIFIC PROVIDER ORDER: SLP eval and treat  Date of order:  2023  EVALUATING THERAPIST: NARDA Miller    CERTIFICATION/RECERTIFICATION PERIOD: 2023 to 24  INSURANCE PROVIDER:  Payor: Dede Franco / Plan: [employer] Cowden / Product Type: *No Product type* /    INSURANCE ID:  [id number] - (Medicaid Managed)   SECONDARY INSURANCE (if applicable):        CPT Codes  EVALUATION: 98520  standardized cognitive performance testing (per hour)    TREATMENT:  Requesting treatment authorization for  12 visits over 12 weeks focusing on the following CPT codes:     13908  speech/language tx     REFERRING/TREATMENT DIAGNOSIS:  Mild Cognitive Impairment    SPEECH THERAPY  PLAN OF CARE   The speech therapy  POC is established based on physician order, speech pathology diagnosis and results of clinical assessment     SPEECH PATHOLOGY DIAGNOSIS:    Mild/moderate cognitive communication deficit    Outpatient Speech Pathology intervention is recommended 1 time  per week for the above certification period. Conditions Requiring Skilled Therapeutic Intervention for speech, language and/or cognition    Decreased short term memory  Decreased problem solving skills   Decreased thought organization    Specific Speech Therapy Interventions to Include: Therapeutic tasks for Cognition    Specific instructions for next treatment:     To initiate memory tasks  To initiate thought organization tasks    SHORT/LONG TERM GOALS  Pt will improve immediate, short

## 2023-12-08 ENCOUNTER — TREATMENT (OUTPATIENT)
Dept: SPEECH THERAPY | Age: 53
End: 2023-12-08
Payer: COMMERCIAL

## 2023-12-08 DIAGNOSIS — R41.841 COGNITIVE COMMUNICATION DEFICIT: Primary | ICD-10-CM

## 2023-12-08 PROCEDURE — 92507 TX SP LANG VOICE COMM INDIV: CPT | Performed by: SPEECH-LANGUAGE PATHOLOGIST

## 2023-12-15 ENCOUNTER — TREATMENT (OUTPATIENT)
Dept: SPEECH THERAPY | Age: 53
End: 2023-12-15
Payer: COMMERCIAL

## 2023-12-15 DIAGNOSIS — R41.841 COGNITIVE COMMUNICATION DEFICIT: Primary | ICD-10-CM

## 2023-12-15 PROCEDURE — 92507 TX SP LANG VOICE COMM INDIV: CPT | Performed by: SPEECH-LANGUAGE PATHOLOGIST

## 2023-12-29 ENCOUNTER — TREATMENT (OUTPATIENT)
Dept: SPEECH THERAPY | Age: 53
End: 2023-12-29
Payer: COMMERCIAL

## 2023-12-29 DIAGNOSIS — R41.841 COGNITIVE COMMUNICATION DEFICIT: Primary | ICD-10-CM

## 2023-12-29 PROCEDURE — 92507 TX SP LANG VOICE COMM INDIV: CPT | Performed by: SPEECH-LANGUAGE PATHOLOGIST

## 2024-01-02 NOTE — PROGRESS NOTES
Patient seen for 45 minute in person session this date.  Patient reports doing ok. Today, we worked on unrelated words presented in groups of 3, 4, and 5 with a short delay added.  Patient completed all the tasks with fair/fair+ performance with min/mod cues.  Patient encouraged to implement use of planner more consistently.  Will continue. Ara Patterson MA/CCC-SLP  [id number]  [place] [address]

## 2024-01-02 NOTE — PROGRESS NOTES
Patient seen for 30 minute in person session this date.  Shortened session today due to scheduling conflict.  Today, we worked on short term recall of associated word lists in groups of 4 and 5 words with a short delay added.  Patient completed with fair+/good performance with min cues.  Homework encouraged.  Will continue.  Ara Patterson MA/CCC-SLP  [id number]  [place] [address]

## 2024-01-02 NOTE — PROGRESS NOTES
Patient seen for 45 minute in person session this date. Patient reports doing ok.  Today, we discussed use of a memory planner as well as completion of brain games for homework.  She was provided with instruction on how to maximize effectiveness of a planner and instructed to implement its use as soon as able.  She was also provided with options for brain games to work on a variety of cognitive skills.  We discussed problem situations that she is experiencing with ADL's and patient was instructed on strategies to help in these situations to decrease frustration.  Homework encouraged.  Will continue. Ara Patterson MA/[facility]-SLP  [id number]  [facility] [address]

## 2024-01-03 ENCOUNTER — TREATMENT (OUTPATIENT)
Dept: SPEECH THERAPY | Age: 54
End: 2024-01-03
Payer: COMMERCIAL

## 2024-01-03 DIAGNOSIS — R41.841 COGNITIVE COMMUNICATION DEFICIT: Primary | ICD-10-CM

## 2024-01-03 PROCEDURE — 92507 TX SP LANG VOICE COMM INDIV: CPT | Performed by: SPEECH-LANGUAGE PATHOLOGIST

## 2024-01-04 NOTE — PROGRESS NOTES
Patient seen for 45 minute in person session this date. Patient reports she is doing ok.  Today, we worked on a variety of timed short term recall and thought organization tasks.  Overall, she completed tasks with fair+ performance with min cues.  She achieved 13.66/20 items with letter fluency and 14.16/20 for generation of category members.  Mod cues given throughout the tasks.  Homework activities encouraged.  Will continue. Ara Patterson MA/CCC-SLP  [id number]  [facility] [address]

## 2024-01-11 ENCOUNTER — TREATMENT (OUTPATIENT)
Dept: SPEECH THERAPY | Age: 54
End: 2024-01-11
Payer: COMMERCIAL

## 2024-01-11 DIAGNOSIS — R41.841 COGNITIVE COMMUNICATION DEFICIT: Primary | ICD-10-CM

## 2024-01-11 PROCEDURE — 92507 TX SP LANG VOICE COMM INDIV: CPT | Performed by: SPEECH-LANGUAGE PATHOLOGIST

## 2024-01-16 NOTE — PROGRESS NOTES
Patient seen for 45 minute in person session this date. Patient reports she is doing ok. She reports that she is playing [employer] for her 'homework'.  Encouraged to try other games as well.  Today, we worked on short term recall of objects and words presented visually.  She completed the tasks with 68% with objects and 50% with words.  We worked on short term recall and mental manipulation of 4 words presented verbally in reverse order.  She struggled with this task and achieved fair- with mod cues.  Homework encouraged.  Will continue. Ara Patterson MA/[facility]-SLP  [id number]  [facility] [address]

## 2024-01-31 ENCOUNTER — HOSPITAL ENCOUNTER (OUTPATIENT)
Dept: INFUSION THERAPY | Age: 54
Discharge: HOME | End: 2024-01-31
Payer: COMMERCIAL

## 2024-01-31 ENCOUNTER — OFFICE VISIT (OUTPATIENT)
Dept: ONCOLOGY | Age: 54
End: 2024-01-31
Payer: COMMERCIAL

## 2024-01-31 VITALS
OXYGEN SATURATION: 96 % | HEIGHT: 69 IN | SYSTOLIC BLOOD PRESSURE: 114 MMHG | DIASTOLIC BLOOD PRESSURE: 80 MMHG | WEIGHT: 225.2 LBS | BODY MASS INDEX: 33.36 KG/M2 | TEMPERATURE: 97.1 F | HEART RATE: 65 BPM

## 2024-01-31 DIAGNOSIS — R63.4 WEIGHT LOSS: ICD-10-CM

## 2024-01-31 DIAGNOSIS — D72.829 LEUKOCYTOSIS, UNSPECIFIED TYPE: ICD-10-CM

## 2024-01-31 DIAGNOSIS — D72.829 LEUKOCYTOSIS, UNSPECIFIED TYPE: Primary | ICD-10-CM

## 2024-01-31 LAB
ALBUMIN SERPL-MCNC: 3.8 G/DL (ref 3.5–5.2)
ALP SERPL-CCNC: 104 U/L (ref 35–104)
ALT SERPL-CCNC: 41 U/L (ref 0–32)
ANION GAP SERPL CALCULATED.3IONS-SCNC: 9 MMOL/L (ref 7–16)
AST SERPL-CCNC: 19 U/L (ref 0–31)
BASOPHILS # BLD: 0.05 K/UL (ref 0–0.2)
BASOPHILS NFR BLD: 0 % (ref 0–2)
BILIRUB SERPL-MCNC: 0.2 MG/DL (ref 0–1.2)
BUN SERPL-MCNC: 9 MG/DL (ref 6–20)
CALCIUM SERPL-MCNC: 9.8 MG/DL (ref 8.6–10.2)
CHLORIDE SERPL-SCNC: 105 MMOL/L (ref 98–107)
CO2 SERPL-SCNC: 26 MMOL/L (ref 22–29)
CREAT SERPL-MCNC: 0.6 MG/DL (ref 0.5–1)
EOSINOPHIL # BLD: 0.21 K/UL (ref 0.05–0.5)
EOSINOPHILS RELATIVE PERCENT: 2 % (ref 0–6)
ERYTHROCYTE [DISTWIDTH] IN BLOOD BY AUTOMATED COUNT: 12 % (ref 11.5–15)
GFR SERPL CREATININE-BSD FRML MDRD: >60 ML/MIN/1.73M2
GLUCOSE SERPL-MCNC: 117 MG/DL (ref 74–99)
HCT VFR BLD AUTO: 45.5 % (ref 34–48)
HGB BLD-MCNC: 15.4 G/DL (ref 11.5–15.5)
IMM GRANULOCYTES # BLD AUTO: 0.06 K/UL (ref 0–0.58)
IMM GRANULOCYTES NFR BLD: 1 % (ref 0–5)
LYMPHOCYTES NFR BLD: 3.42 K/UL (ref 1.5–4)
LYMPHOCYTES RELATIVE PERCENT: 31 % (ref 20–42)
MCH RBC QN AUTO: 31.6 PG (ref 26–35)
MCHC RBC AUTO-ENTMCNC: 33.8 G/DL (ref 32–34.5)
MCV RBC AUTO: 93.4 FL (ref 80–99.9)
MONOCYTES NFR BLD: 0.54 K/UL (ref 0.1–0.95)
MONOCYTES NFR BLD: 5 % (ref 2–12)
NEUTROPHILS NFR BLD: 62 % (ref 43–80)
NEUTS SEG NFR BLD: 6.84 K/UL (ref 1.8–7.3)
PLATELET # BLD AUTO: 341 K/UL (ref 130–450)
PMV BLD AUTO: 9.9 FL (ref 7–12)
POTASSIUM SERPL-SCNC: 4 MMOL/L (ref 3.5–5)
PROT SERPL-MCNC: 7.5 G/DL (ref 6.4–8.3)
RBC # BLD AUTO: 4.87 M/UL (ref 3.5–5.5)
SODIUM SERPL-SCNC: 140 MMOL/L (ref 132–146)
WBC OTHER # BLD: 11.1 K/UL (ref 4.5–11.5)

## 2024-01-31 PROCEDURE — G8427 DOCREV CUR MEDS BY ELIG CLIN: HCPCS | Performed by: STUDENT IN AN ORGANIZED HEALTH CARE EDUCATION/TRAINING PROGRAM

## 2024-01-31 PROCEDURE — G8417 CALC BMI ABV UP PARAM F/U: HCPCS | Performed by: STUDENT IN AN ORGANIZED HEALTH CARE EDUCATION/TRAINING PROGRAM

## 2024-01-31 PROCEDURE — 99212 OFFICE O/P EST SF 10 MIN: CPT

## 2024-01-31 PROCEDURE — 85025 COMPLETE CBC W/AUTO DIFF WBC: CPT

## 2024-01-31 PROCEDURE — G8484 FLU IMMUNIZE NO ADMIN: HCPCS | Performed by: STUDENT IN AN ORGANIZED HEALTH CARE EDUCATION/TRAINING PROGRAM

## 2024-01-31 PROCEDURE — 99213 OFFICE O/P EST LOW 20 MIN: CPT | Performed by: STUDENT IN AN ORGANIZED HEALTH CARE EDUCATION/TRAINING PROGRAM

## 2024-01-31 PROCEDURE — 80053 COMPREHEN METABOLIC PANEL: CPT

## 2024-01-31 PROCEDURE — 3017F COLORECTAL CA SCREEN DOC REV: CPT | Performed by: STUDENT IN AN ORGANIZED HEALTH CARE EDUCATION/TRAINING PROGRAM

## 2024-01-31 PROCEDURE — 36415 COLL VENOUS BLD VENIPUNCTURE: CPT

## 2024-01-31 PROCEDURE — 4004F PT TOBACCO SCREEN RCVD TLK: CPT | Performed by: STUDENT IN AN ORGANIZED HEALTH CARE EDUCATION/TRAINING PROGRAM

## 2024-01-31 RX ORDER — PALIPERIDONE PALMITATE 234 MG/1.5ML
INJECTION INTRAMUSCULAR
COMMUNITY
Start: 2024-01-04

## 2024-01-31 NOTE — PROGRESS NOTES
[facility] MEDICAL ONCOLOGY  [address] [facility]  [place] [address]  Dept: [phone]  Loc: [phone]  Clinic Progress Note    Referring Provider:  CARLOS ENRIQUE Telles CNP    Reason for Visit:   Leukocytosis    PCP:  Roddy Timmons APRN - CNP    Demographics: 53 y.o. female    Chief Complaint:   Chief Complaint   Patient presents with    Discuss Labs    Follow-up     leukocytosis       Subjective:  Overall doing well.   She is on CPAP now for moderate ANNA.   Still smoking.     HPI from Initial Outpatient Consultation  (5/12/23):  The patient is a 52 y.o. female who comes in after being referred by her PCP for leukocytosis.  The patient reports that she has been aware of having elevated WBC \"her whole life\".  She also reported starting smoking around the age of 12.  Within the last few months, the patient reported having night sweats.  She has also had an approximate 40 pound weight loss over the last year.  And around that time, she has had significantly decreased appetite, in which she has noticed decrease in her caloric intake.    According to PCP records, patient's WBC reached up to 15 back in 4/7/2023.  There is no differential on CBC.  Previous CBCs over the last 8 years noted intermittent leukocytosis, reaching up to 14.1.  Based on her records, last normal WBC was back in 9/28/2022.  Absolute neutrophil and lymphocyte counts were within normal limits.    She also mentioned having issues with amenorrhea, not having a menstrual cycle over the last 2 years.  And being told by her OBG when laboratory parameters are not suggestive of menopause.  Due to the patient, she underwent extensive work-up which was largely unremarkable, and was recommended medical methods to restart her menstrual cycles, which the patient declined.    She also noted being largely up-to-date with her cancer screenings, colonoscopy 6 years ago, Pap smear a few weeks

## 2024-02-01 ENCOUNTER — TREATMENT (OUTPATIENT)
Dept: SPEECH THERAPY | Age: 54
End: 2024-02-01

## 2024-02-01 DIAGNOSIS — R41.841 COGNITIVE COMMUNICATION DEFICIT: Primary | ICD-10-CM

## 2024-02-06 ENCOUNTER — TREATMENT (OUTPATIENT)
Dept: SPEECH THERAPY | Age: 54
End: 2024-02-06

## 2024-02-06 DIAGNOSIS — R41.841 COGNITIVE COMMUNICATION DEFICIT: Primary | ICD-10-CM

## 2024-02-08 ENCOUNTER — OFFICE VISIT (OUTPATIENT)
Age: 54
End: 2024-02-08
Payer: COMMERCIAL

## 2024-02-08 VITALS
SYSTOLIC BLOOD PRESSURE: 106 MMHG | HEART RATE: 92 BPM | TEMPERATURE: 98.4 F | BODY MASS INDEX: 33.15 KG/M2 | HEIGHT: 69 IN | DIASTOLIC BLOOD PRESSURE: 71 MMHG | WEIGHT: 223.8 LBS

## 2024-02-08 DIAGNOSIS — R41.3 MEMORY LOSS: Primary | ICD-10-CM

## 2024-02-08 DIAGNOSIS — R41.3 MEMORY LOSS: ICD-10-CM

## 2024-02-08 DIAGNOSIS — Z86.59 PERSONAL HISTORY OF SCHIZOPHRENIA: ICD-10-CM

## 2024-02-08 DIAGNOSIS — G25.2 ACTION TREMOR: ICD-10-CM

## 2024-02-08 LAB
C-REACTIVE PROTEIN: 19 MG/L (ref 0–5)
T4 FREE: 1.4 NG/DL (ref 0.9–1.7)
TSH SERPL DL<=0.05 MIU/L-ACNC: 1.19 UIU/ML (ref 0.27–4.2)
VITAMIN D 25-HYDROXY: 19.5 NG/ML (ref 30–100)

## 2024-02-08 PROCEDURE — 3017F COLORECTAL CA SCREEN DOC REV: CPT | Performed by: PSYCHIATRY & NEUROLOGY

## 2024-02-08 PROCEDURE — 4004F PT TOBACCO SCREEN RCVD TLK: CPT | Performed by: PSYCHIATRY & NEUROLOGY

## 2024-02-08 PROCEDURE — 99204 OFFICE O/P NEW MOD 45 MIN: CPT | Performed by: PSYCHIATRY & NEUROLOGY

## 2024-02-08 PROCEDURE — G8484 FLU IMMUNIZE NO ADMIN: HCPCS | Performed by: PSYCHIATRY & NEUROLOGY

## 2024-02-08 PROCEDURE — G8417 CALC BMI ABV UP PARAM F/U: HCPCS | Performed by: PSYCHIATRY & NEUROLOGY

## 2024-02-08 PROCEDURE — G8428 CUR MEDS NOT DOCUMENT: HCPCS | Performed by: PSYCHIATRY & NEUROLOGY

## 2024-02-08 RX ORDER — PRIMIDONE 50 MG/1
50 TABLET ORAL NIGHTLY
Qty: 30 TABLET | Refills: 3 | Status: SHIPPED | OUTPATIENT
Start: 2024-02-08

## 2024-02-08 RX ORDER — DONEPEZIL HYDROCHLORIDE 10 MG/1
10 TABLET, FILM COATED ORAL NIGHTLY
Qty: 30 TABLET | Refills: 3 | Status: SHIPPED | OUTPATIENT
Start: 2024-02-08

## 2024-02-08 RX ORDER — BUDESONIDE AND FORMOTEROL FUMARATE DIHYDRATE 80; 4.5 UG/1; UG/1
AEROSOL RESPIRATORY (INHALATION)
COMMUNITY
Start: 2024-02-01

## 2024-02-08 RX ORDER — RISPERIDONE 1 MG/1
TABLET ORAL
COMMUNITY
Start: 2024-01-04

## 2024-02-08 RX ORDER — VARENICLINE TARTRATE 1 MG/1
TABLET, FILM COATED ORAL
COMMUNITY
Start: 2024-01-04

## 2024-02-08 RX ORDER — ROPINIROLE 2 MG/1
TABLET, FILM COATED ORAL
COMMUNITY
Start: 2024-01-04

## 2024-02-08 ASSESSMENT — MINI MENTAL STATE EXAM
WHAT STATE [OR PROVINCE] ARE WE IN?: 1
HAND THE PERSON A PENCIL AND PAPER. SAY: WRITE ANY COMPLETE SENTENCE ON THAT
PIECE OF PAPER. (NOTE: THE SENTENCE MUST MAKE SENSE. IGNORE SPELLING ERRORS): 1
PLACE DESIGN, ERASER AND PENCIL IN FRONT OF THE PERSON.  SAY:  COPY THIS DESIGN PLEASE.  SHOW: DESIGN. ALLOW: MULTIPLE TRIES. WAIT UNTIL PERSON IS FINISHED AND HANDS IT BACK. SCORE: ONLY FOR DIAGRAM WITH 4-SIDED FIGURE BETWEEN TWO 5-SIDED FIGURES: 1
WHAT FLOOR ARE WE ON [IN FACILITY]?/ WHAT ROOM ARE WE IN [IN HOME]?: 1
WHICH SEASON IS THIS?: 1
WHAT IS THE NAME OF THIS BUILDING [IN FACILITY]?/WHAT IS THE STREET ADDRESS OF THIS HOUSE [IN HOME]?: 1
WHAT CITY/TOWN ARE WE IN?: 1
SAY: I WOULD LIKE YOU TO REPEAT THIS PHRASE AFTER ME: NO IFS, ANDS, OR BUTS.: 1
WHAT YEAR IS THIS?: 1
SHOW: PENCIL [OBJECT] ASK: WHAT IS THIS CALLED?: 1
WHAT MONTH IS THIS?: 1
NOW WHAT WERE THE THREE OBJECTS I ASKED YOU TO REMEMBER?: 1
SAY: I AM GOING TO NAME THREE OBJECTS. WHEN I AM FINISHED, I WANT YOU TO REPEAT
THEM. REMEMBER WHAT THEY ARE BECAUSE I AM GOING TO ASK YOU TO NAME THEM AGAIN IN
A FEW MINUTES.  SAY THE FOLLOWING WORDS SLOWLY AT 1-SECOND INTERVALS - BALL/ CAR/ MAN [ITERATIONS FOR REPEAT ADMINISTRATION]: 3
WHAT DAY OF THE WEEK IS THIS?: 1
SAY: FOLD THE PAPER IN HALF ONCE WITH BOTH HANDS, SCORE IF PAPER IS CORRECTLY FOLDED IN HALF.: 1
ASK THE PERSON IF HE IS RIGHT OR LEFT-HANDED. TAKE A PIECE OF PAPER AND HOLD IT UP IN
FRONT OF THE PERSON. SAY: TAKE THIS PAPER IN YOUR RIGHT/LEFT HAND (WHICHEVER IS NON-
DOMINANT), SCORE IF PAPER IS PICKED UP IN CORRECT HAND.: 1
SAY: READ THE WORDS ON THE PAGE AND THEN DO WHAT IT SAYS. THEN HAND THE PERSON
THE SHEET WITH CLOSE YOUR EYES ON IT. IF THE SUBJECT READS AND DOES NOT CLOSE THEIR EYES, REPEAT UP TO THREE TIMES. SCORE ONLY IF SUBJECT CLOSES EYES.: 1
WHAT IS TODAY'S DATE?: 0
WHAT COUNTRY ARE WE IN?: 1
SUM ALL MMSE QUESTIONS FOR TOTAL SCORE [OUT OF 30].: 25
SAY: I WOULD LIKE YOU TO COUNT BACKWARD FROM 100 BY SEVENS: 3
SHOW: WRISTWATCH [OBJECT] ASK: WHAT IS THIS CALLED?: 1
SAY: PUT THE PAPER DOWN ON THE FLOOR, SCORE IF PAPER IS PLACED BACK ON FLOOR: 1

## 2024-02-08 NOTE — PROGRESS NOTES
Maki Vargas is a 53 y.o. female presenting as a new patient for a   Chief Complaint   Patient presents with    New Patient    Memory Loss        Moved from [place] to [place]   Onset of symptoms: 2 year   Progression:   Has been having memory loss - 2 years ago  Ended up getting into a car which was similar to her own car.   Ended up getting arrested for stealing  Was jailed for it  Was having severe anxiety and panic attacks at that time with boy friend issues.,   It has settled.   Has hx of schizhophrenia      But since then noticing tremors.       Now:   Tremors:   In hands.  Not in head/legs.   In rest and action  Worse with action.     Right handed.  Affects hand writing.       Stiffness: No  Gait/Walking difficulties: has lightheadedness from low bp   Cane, walker, wheelchair use: No  Falls: No  Mood/behavior: has anxiety- but controlled  Has schizophrenia- no hallucinations. Under good control with invega, risperidone    Hallucinations: No  Sleep: Yes: Comment: gets only 3 hours of sleep. Uses cpap  Slowness in daily activities: No  Slurred speech: No  Drooling of saliva: Yes: Comment: at night . Has no dentition  Swallowing difficulty: No  Memory loss: had learning disability and hwas a  with fetal alcohol syndrome     -does cognitive therapy. Speech therapy thinks it is better.   -lives alone  -adl's- independent. Previously debilitated when polypharmacy interfering  -driving- arrested 2 years ago for stealing a car when she drove away with a car similar to hers.   -finances - keeps up with bills. In [place] has not had trouble. No billing issues or overdraft  -cooking- no trouble. No accidents.   -medication: can remember to take her medications on time.         Family hx of dementia: mother in 60's     Testing done:  none  Date:   Family history of Parkinson's Disease: No  Caffeine intake: no caffeine intake  Stopped pop    Inhalers: uses symbicort prn     Treatment: inderal - did not help  But has low

## 2024-02-09 LAB
FOLATE: 5.1 NG/ML (ref 4.8–24.2)
RPR: NONREACTIVE
SEDIMENTATION RATE, ERYTHROCYTE: 27 MM/HR (ref 0–20)
VITAMIN B-12: 242 PG/ML (ref 211–946)

## 2024-02-12 LAB
AMMONIA: 49 UMOL/L (ref 11–51)
ANTI-NUCLEAR ANTIBODY (ANA): NEGATIVE
BORRELIA BURGDORFERI ABS TOTAL: 0.18 IV
COPPER: 150 UG/DL (ref 80–155)

## 2024-02-13 ENCOUNTER — TREATMENT (OUTPATIENT)
Dept: SPEECH THERAPY | Age: 54
End: 2024-02-13

## 2024-02-13 DIAGNOSIS — R41.841 COGNITIVE COMMUNICATION DEFICIT: Primary | ICD-10-CM

## 2024-02-14 LAB — VITAMIN B1 WHOLE BLOOD: 163 NMOL/L (ref 70–180)

## 2024-02-16 NOTE — PROGRESS NOTES
Patient seen for 45 minute in person session this date.  Patient reports she is doing ok.  She reports that she has  not been doing any brain games.  We discussed need for daily practice in order to achieve max progress.  She expressed understanding and will try to work more on them.  Today, we worked on short term recall of words presented visually with short delay added.  She completed the tasks with fair performance with mod cues needed.  Homework encouraged.  Will continue. Ara Patterson MA/CCC-SLP  [id number]  [facility] [address]

## 2024-02-16 NOTE — PROGRESS NOTES
Patient seen for 45 minute in person session this date. Patient doing fair per her report.  Today, we worked on short term recall of functional phrases presented verbally in groups of three with a short delay added.  She completed all with fair/fair+ with min/mod cues.  We worked on sentence recall of sentences presented verbally with a short delay added.  She completed this task with fair performance with min cues.  Homework tasks encouraged.  Will continue. Ara Patterson MA/CCC-SLP  [id number]  [facility] [address]

## 2024-02-16 NOTE — PROGRESS NOTES
Patient seen for 45 minute in person session this date. Patient reports doing ok.  Today, we worked on short term recall of 4 step commands presented verbally with no delay added.  She completed the tasks with fair performance with mod cues/repetitions needed.  Patient reports little practice on brain games since last session.  We also spent time discussing functional solutions to some issues she continues to have with misplacing items in her apartment.  Homework strongly encouraged.  Will continue. Ara Patterson MA/CCC-SLP  [id number]  [facility] [address]

## 2024-02-17 ENCOUNTER — HOSPITAL ENCOUNTER (OUTPATIENT)
Dept: MRI IMAGING | Age: 54
End: 2024-02-17
Attending: PSYCHIATRY & NEUROLOGY
Payer: COMMERCIAL

## 2024-02-17 DIAGNOSIS — Z86.59 PERSONAL HISTORY OF SCHIZOPHRENIA: ICD-10-CM

## 2024-02-17 DIAGNOSIS — R41.3 MEMORY LOSS: ICD-10-CM

## 2024-02-17 PROCEDURE — 70553 MRI BRAIN STEM W/O & W/DYE: CPT

## 2024-02-17 PROCEDURE — A9577 INJ MULTIHANCE: HCPCS | Performed by: RADIOLOGY

## 2024-02-17 PROCEDURE — 6360000004 HC RX CONTRAST MEDICATION: Performed by: RADIOLOGY

## 2024-02-17 RX ADMIN — GADOBENATE DIMEGLUMINE 20 ML: 529 INJECTION, SOLUTION INTRAVENOUS at 11:17

## 2024-02-21 ENCOUNTER — TREATMENT (OUTPATIENT)
Dept: SPEECH THERAPY | Age: 54
End: 2024-02-21
Payer: COMMERCIAL

## 2024-02-21 DIAGNOSIS — R41.841 COGNITIVE COMMUNICATION DEFICIT: Primary | ICD-10-CM

## 2024-02-21 PROCEDURE — 92507 TX SP LANG VOICE COMM INDIV: CPT | Performed by: SPEECH-LANGUAGE PATHOLOGIST

## 2024-03-08 ENCOUNTER — TREATMENT (OUTPATIENT)
Dept: SPEECH THERAPY | Age: 54
End: 2024-03-08
Payer: COMMERCIAL

## 2024-03-08 DIAGNOSIS — R41.841 COGNITIVE COMMUNICATION DEFICIT: Primary | ICD-10-CM

## 2024-03-08 PROCEDURE — 92507 TX SP LANG VOICE COMM INDIV: CPT | Performed by: SPEECH-LANGUAGE PATHOLOGIST

## 2024-03-13 ENCOUNTER — OFFICE VISIT (OUTPATIENT)
Dept: ENDOCRINOLOGY | Age: 54
End: 2024-03-13
Payer: COMMERCIAL

## 2024-03-13 VITALS
DIASTOLIC BLOOD PRESSURE: 73 MMHG | BODY MASS INDEX: 33.47 KG/M2 | SYSTOLIC BLOOD PRESSURE: 104 MMHG | HEIGHT: 69 IN | WEIGHT: 226 LBS | HEART RATE: 86 BPM | OXYGEN SATURATION: 96 %

## 2024-03-13 DIAGNOSIS — E55.9 VITAMIN D DEFICIENCY: Primary | ICD-10-CM

## 2024-03-13 DIAGNOSIS — E03.9 HYPOTHYROIDISM, UNSPECIFIED TYPE: ICD-10-CM

## 2024-03-13 PROCEDURE — G8427 DOCREV CUR MEDS BY ELIG CLIN: HCPCS | Performed by: INTERNAL MEDICINE

## 2024-03-13 PROCEDURE — 3017F COLORECTAL CA SCREEN DOC REV: CPT | Performed by: INTERNAL MEDICINE

## 2024-03-13 PROCEDURE — 4004F PT TOBACCO SCREEN RCVD TLK: CPT | Performed by: INTERNAL MEDICINE

## 2024-03-13 PROCEDURE — G8484 FLU IMMUNIZE NO ADMIN: HCPCS | Performed by: INTERNAL MEDICINE

## 2024-03-13 PROCEDURE — 99214 OFFICE O/P EST MOD 30 MIN: CPT | Performed by: INTERNAL MEDICINE

## 2024-03-13 PROCEDURE — G8417 CALC BMI ABV UP PARAM F/U: HCPCS | Performed by: INTERNAL MEDICINE

## 2024-03-13 RX ORDER — PROPRANOLOL HYDROCHLORIDE 20 MG/1
TABLET ORAL
COMMUNITY
Start: 2024-03-07

## 2024-03-13 RX ORDER — BENZTROPINE MESYLATE 1 MG/1
TABLET ORAL
COMMUNITY
Start: 2024-03-07

## 2024-03-13 RX ORDER — LEVOTHYROXINE SODIUM 112 UG/1
TABLET ORAL
Qty: 90 TABLET | Refills: 3 | Status: SHIPPED | OUTPATIENT
Start: 2024-03-13

## 2024-03-13 NOTE — PROGRESS NOTES
[facility] Department of Endocrinology Diabetes and Metabolism   [address] [place] [address]   Phone: [phone]  Fax: [phone]    Date of Service: 3/13/2024  Primary Care Physician: oRddy Timmons, APRN - CNP  Provider: Mohsen Vivar MD        Reason for the visit:  Primary Hypothyroidism    History of Present Illness:  The history is provided by the patient. No  was used. Accuracy of the patient data is excellent.         Maki Vargas is a very pleasant 53 y.o. female seen today for management of hypothyroidism     The patient was diagnosed with hypothyroidism 15 years ago  and since then has been on thyroid hormones replacement.     The patient is currently on Levothyroxine 112 mcg daily. Patient takes levothyroxine in the morning at empty stomach, wait one hour before eating , avoid multivitamins containing calcium  or iron with it.    Blood work from February 8, 2024 showed a very good thyroid level    Lab Results   Component Value Date/Time    TSH 1.19 02/08/2024 02:57 PM    T4FREE 1.4 02/08/2024 02:57 PM    B2IFIVY 7.9 09/28/2022 08:39 AM    M4JKHWS 82.96 09/28/2022 08:39 AM    TPOABS 475.8 (H) 11/05/2018 12:04 PM       PAST MEDICAL HISTORY   Past Medical History:   Diagnosis Date    Abnormal Pap smear of cervix     ALT (SGPT) level raised     Anemia     Anxiety     Arthritis     back    Bronchitis     Complication of anesthesia     Depressive disorder, not elsewhere classified     Dietary surveillance and counseling     History of conization of cervix 3 times in [place]    Hx of conization of cervix complicating pregnancy 1990[place]    [place]    Hyperlipidemia     Hypothyroidism     Leukocytosis, unspecified     Memory disorder     Nonspecific elevation of levels of transaminase or lactic acid dehydrogenase (LDH)     Other abnormal blood chemistry     Overactive bladder     Pneumonia     Sleep difficulties        PAST SURGICAL

## 2024-03-14 ENCOUNTER — HOSPITAL ENCOUNTER (OUTPATIENT)
Age: 54
Discharge: HOME | End: 2024-03-14
Payer: COMMERCIAL

## 2024-03-14 LAB
T4 FREE SERPL-MCNC: 1.1 NG/DL (ref 0.9–1.7)
TSH SERPL DL<=0.05 MIU/L-ACNC: 2.81 UIU/ML (ref 0.27–4.2)

## 2024-03-14 PROCEDURE — 84443 ASSAY THYROID STIM HORMONE: CPT

## 2024-03-14 PROCEDURE — 36415 COLL VENOUS BLD VENIPUNCTURE: CPT

## 2024-03-14 PROCEDURE — 84439 ASSAY OF FREE THYROXINE: CPT

## 2024-03-20 ENCOUNTER — TREATMENT (OUTPATIENT)
Dept: SPEECH THERAPY | Age: 54
End: 2024-03-20
Payer: COMMERCIAL

## 2024-03-20 DIAGNOSIS — R41.841 COGNITIVE COMMUNICATION DEFICIT: Primary | ICD-10-CM

## 2024-03-20 PROCEDURE — 92507 TX SP LANG VOICE COMM INDIV: CPT | Performed by: SPEECH-LANGUAGE PATHOLOGIST

## 2024-03-21 ENCOUNTER — TELEPHONE (OUTPATIENT)
Dept: ENDOCRINOLOGY | Age: 54
End: 2024-03-21

## 2024-03-21 NOTE — PROGRESS NOTES
Patient seen for 45 minute in persons session this date.  Patient reports she is doing ok.  Today, we re-tested cognitive skills as we are only 1 visit away from end of authorized time.  We completed the RIPA-2 with the following gains:   Immediate Memory:  initial score 21/30 Marked Deficit to current - 26/30 Mild Deficit;  Problem Solving - Initial score 25/30 Low Mild Deficit to current - 30/30 Within Functional Limits (WFL); Thought Organization - Initial score - 24/30 Moderate Deficit to current - 27/30 Mild Deficit. All other areas tested were completed with 30/30 WFL as she scored at time of initial evaluation.  Patient has made excellent gains in overall cognitive function but continues to need further services to further improve cognitive skills.  Prognosis is felt to be good due to progress achieved to date.  Patient pleased with gains but feels that she continues to need more therapy to help improve skills more.  Will request additional visits from insurance after next session.  Encouraged home activities.  Will continue.  Ara Patterson MA/CCC-SLP  [id number]  [facility] [address]

## 2024-03-21 NOTE — PROGRESS NOTES
Patient seen for 45 minute in person session this date. Patient reports she is doing ok.  No significant changes noted since last session per her report.  She states that she continues to play Solitaire but not doing much else.  She hasn't explored other activities/hobbies as discussed in last session but states that she will. Today, we continued to work on short term recall of long sentences/list of directions presented verbally with a delay added.  She completed the tasks with fair+ performance with min/mod cues/repetitions.  Again, she responded with mostly paraphrased sentences vs verbatim.  Homework activities encouraged.  Will continue. Ara Patterson MA/CCC-SLP  [id number]  CPt [id number]

## 2024-03-21 NOTE — PROGRESS NOTES
Patient seen for 45 minute in person session this date.  Patient reports that she is doing ok.  She states that she is not doing much at all.  She has stopped going to her [employer] classes and is unsure if she wants to return.  We discussed and she was encouraged to consider a new hobby or activity to help with brain stimulation.  She will think about it.  Today, we worked on short term recall of longer sentences presented verbally with a short delay added.  She completed the tasks with fair+ performance with min cues/repetitions.  She did paraphrase more than recall verbatim but much improvement noted in immediate memory with this task.  Homework activities encouraged.  Will continue. Ara Patterson MA/CCC-SLP  [id number]  CPT [id number]

## 2024-03-22 NOTE — TELEPHONE ENCOUNTER
Notify patient to  Excellent thyroid hormones are very good.  Continue current dose of the thyroid medication

## 2024-03-28 ENCOUNTER — TREATMENT (OUTPATIENT)
Dept: SPEECH THERAPY | Age: 54
End: 2024-03-28
Payer: COMMERCIAL

## 2024-03-28 DIAGNOSIS — R41.841 COGNITIVE COMMUNICATION DEFICIT: Primary | ICD-10-CM

## 2024-03-28 PROCEDURE — 92507 TX SP LANG VOICE COMM INDIV: CPT | Performed by: SPEECH-LANGUAGE PATHOLOGIST

## 2024-04-11 ENCOUNTER — TREATMENT (OUTPATIENT)
Dept: SPEECH THERAPY | Age: 54
End: 2024-04-11

## 2024-04-11 DIAGNOSIS — R41.841 COGNITIVE COMMUNICATION DEFICIT: Primary | ICD-10-CM

## 2024-04-11 NOTE — PROGRESS NOTES
Patient seen for 45 minute in person session this date.  Patient reports that she is doing ok.  Today, we continued working on short term recall of sentence length information presented verbally with a delay/distraction added.  She completed the tasks with fair/fair+ performance with min/mod cues/repetitions needed.  Homework activities were encouraged.  We agreed to continue with therapy services once additional insurance authorization is obtained.          Progress Report    Per patient's last session retesting, the following was noted:       We completed the RIPA-2 with the following gains:   Immediate Memory:  initial score 21/30 Marked Deficit to current - 26/30 Mild Deficit;  Problem Solving - Initial score 25/30 Low Mild Deficit to current - 30/30 Within Functional Limits (WFL); Thought Organization - Initial score - 24/30 Moderate Deficit to current - 27/30 Mild Deficit. All other areas tested were completed with 30/30 WFL as she scored at time of initial evaluation.  Patient has made excellent gains in overall cognitive function but continues to need further services to further improve cognitive skills.  Prognosis is felt to be good due to progress achieved to date.  Patient pleased with gains but feels that she continues to need more therapy to help further improve skills     This therapist is requesting an additional 12 visits at CPT code 33488 and diagnosis of R41.841 to continue working towards achieving goals established at time of initial POC.  Patient's prognosis for further improvements is felt to be good with additional therapy services.  Thank you for your consideration.    Ara Blank MA/[facility]-SLP  [id number]

## 2024-04-12 ENCOUNTER — TRANSCRIBE ORDERS (OUTPATIENT)
Dept: ADMINISTRATIVE | Age: 54
End: 2024-04-12

## 2024-04-12 DIAGNOSIS — Z12.31 ENCOUNTER FOR SCREENING MAMMOGRAM FOR MALIGNANT NEOPLASM OF BREAST: Primary | ICD-10-CM

## 2024-04-18 ENCOUNTER — TREATMENT (OUTPATIENT)
Dept: SPEECH THERAPY | Age: 54
End: 2024-04-18
Payer: COMMERCIAL

## 2024-04-18 DIAGNOSIS — R41.841 COGNITIVE COMMUNICATION DEFICIT: Primary | ICD-10-CM

## 2024-04-18 PROCEDURE — 92507 TX SP LANG VOICE COMM INDIV: CPT | Performed by: SPEECH-LANGUAGE PATHOLOGIST

## 2024-04-25 ENCOUNTER — TREATMENT (OUTPATIENT)
Dept: SPEECH THERAPY | Age: 54
End: 2024-04-25

## 2024-04-25 DIAGNOSIS — R41.841 COGNITIVE COMMUNICATION DEFICIT: Primary | ICD-10-CM

## 2024-04-26 ENCOUNTER — HOSPITAL ENCOUNTER (OUTPATIENT)
Dept: MAMMOGRAPHY | Age: 54
End: 2024-04-26
Payer: COMMERCIAL

## 2024-04-26 DIAGNOSIS — Z12.31 ENCOUNTER FOR SCREENING MAMMOGRAM FOR MALIGNANT NEOPLASM OF BREAST: ICD-10-CM

## 2024-04-26 PROCEDURE — 77063 BREAST TOMOSYNTHESIS BI: CPT

## 2024-05-02 ENCOUNTER — TREATMENT (OUTPATIENT)
Dept: SPEECH THERAPY | Age: 54
End: 2024-05-02
Payer: COMMERCIAL

## 2024-05-02 DIAGNOSIS — R41.841 COGNITIVE COMMUNICATION DEFICIT: Primary | ICD-10-CM

## 2024-05-02 PROCEDURE — 92507 TX SP LANG VOICE COMM INDIV: CPT | Performed by: SPEECH-LANGUAGE PATHOLOGIST

## 2024-05-03 NOTE — PROGRESS NOTES
No abnormal movements                                                No deficit to touch                       LEFT ARM : No tenderness or masses or defect                                          Full range of motion, with no pain or contracture, dislocation or laxity;                                          Normal tone                                           No atrophy                                          No abnormal movements                                           No deficit to touch      SKIN: Skin and subcu of chest, breast, arms with: No lesions                                                                                  No rashes                                                                                  No ulcers             Skin and subcu of chest, breast, and arms with :  No induration                                                                                               No nodules                                                                                              No tightening.    EXTREMITIES: No clubbing, cyanosis, edema    NEURO: CN II to XII grossly intact    Alert and oriented x4                         Breasts:                 Fibrocystic                                            Symmetric                                              No masses or tenderness on palpation                     SKIN: No lesions                No AXILLARY adenopathy                                                                              Pelvic exam:                 External genitalia:   No lesions      Urethra:   No lesions    Bladder:  Nontender to palpation    Vagina: No lesions               No discharge    Cervix: No lesions               Uterus:: ANTE  -verted                                             __8-10_____    weeks sized                                                      Mobile

## 2024-05-09 ENCOUNTER — TREATMENT (OUTPATIENT)
Dept: SPEECH THERAPY | Age: 54
End: 2024-05-09
Payer: COMMERCIAL

## 2024-05-09 ENCOUNTER — HOSPITAL ENCOUNTER (OUTPATIENT)
Age: 54
Discharge: HOME | End: 2024-05-09
Payer: COMMERCIAL

## 2024-05-09 ENCOUNTER — OFFICE VISIT (OUTPATIENT)
Age: 54
End: 2024-05-09

## 2024-05-09 VITALS
BODY MASS INDEX: 36.09 KG/M2 | SYSTOLIC BLOOD PRESSURE: 115 MMHG | DIASTOLIC BLOOD PRESSURE: 75 MMHG | HEART RATE: 104 BPM | WEIGHT: 244.4 LBS

## 2024-05-09 DIAGNOSIS — N91.2 ABSENCE OF MENSTRUATION: ICD-10-CM

## 2024-05-09 DIAGNOSIS — Z11.3 SCREEN FOR STD (SEXUALLY TRANSMITTED DISEASE): ICD-10-CM

## 2024-05-09 DIAGNOSIS — Z12.4 CERVICAL CANCER SCREENING: Primary | ICD-10-CM

## 2024-05-09 DIAGNOSIS — R41.841 COGNITIVE COMMUNICATION DEFICIT: Primary | ICD-10-CM

## 2024-05-09 LAB — FSH SERPL-ACNC: 1.4 MIU/ML

## 2024-05-09 PROCEDURE — 83001 ASSAY OF GONADOTROPIN (FSH): CPT

## 2024-05-09 PROCEDURE — 92507 TX SP LANG VOICE COMM INDIV: CPT | Performed by: SPEECH-LANGUAGE PATHOLOGIST

## 2024-05-09 PROCEDURE — 36415 COLL VENOUS BLD VENIPUNCTURE: CPT

## 2024-05-09 ASSESSMENT — ENCOUNTER SYMPTOMS
VOMITING: 0
BLOOD IN STOOL: 0
RECTAL PAIN: 0
NAUSEA: 0
CONSTIPATION: 0
ABDOMINAL DISTENTION: 0
ABDOMINAL PAIN: 0
DIARRHEA: 0

## 2024-05-09 ASSESSMENT — PATIENT HEALTH QUESTIONNAIRE - PHQ9
2. FEELING DOWN, DEPRESSED OR HOPELESS: NOT AT ALL
SUM OF ALL RESPONSES TO PHQ QUESTIONS 1-9: 0
SUM OF ALL RESPONSES TO PHQ QUESTIONS 1-9: 0
SUM OF ALL RESPONSES TO PHQ9 QUESTIONS 1 & 2: 0
1. LITTLE INTEREST OR PLEASURE IN DOING THINGS: NOT AT ALL
SUM OF ALL RESPONSES TO PHQ QUESTIONS 1-9: 0
SUM OF ALL RESPONSES TO PHQ QUESTIONS 1-9: 0

## 2024-05-09 NOTE — PATIENT INSTRUCTIONS
Please have the studies ordered in the next  4  weeks.    For ultrasound or any x-rays, you can have them done at [facility].  You will need to call the radiology department to schedule those.  Please make sure my staff gives you the phone number for radiology before you leave.  If you opt to have the studies done at a different facility, please ask that facility to fax your results to my office.  The fax number here is: [phone].    For labs, you may have them done at [facility].  They are open 8 AM to 5 PM Monday through Friday.  If you opt to have your labs done at a different facility, please ask the facility to fax your results to my office.  The fax number here is: [phone].    I will call you with the reports.      Please call the office if I have not contacted you with the reports by 2 weeks after you have had them done.

## 2024-05-10 ENCOUNTER — OFFICE VISIT (OUTPATIENT)
Age: 54
End: 2024-05-10
Payer: COMMERCIAL

## 2024-05-10 VITALS
DIASTOLIC BLOOD PRESSURE: 81 MMHG | TEMPERATURE: 98.1 F | HEIGHT: 69 IN | WEIGHT: 243.1 LBS | HEART RATE: 98 BPM | SYSTOLIC BLOOD PRESSURE: 126 MMHG | BODY MASS INDEX: 36.01 KG/M2

## 2024-05-10 DIAGNOSIS — E55.9 VITAMIN D DEFICIENCY: ICD-10-CM

## 2024-05-10 DIAGNOSIS — E53.8 VITAMIN B12 DEFICIENCY: ICD-10-CM

## 2024-05-10 DIAGNOSIS — R41.3 MEMORY LOSS: Primary | ICD-10-CM

## 2024-05-10 DIAGNOSIS — G25.2 ACTION TREMOR: ICD-10-CM

## 2024-05-10 PROCEDURE — G8417 CALC BMI ABV UP PARAM F/U: HCPCS | Performed by: PSYCHIATRY & NEUROLOGY

## 2024-05-10 PROCEDURE — 3017F COLORECTAL CA SCREEN DOC REV: CPT | Performed by: PSYCHIATRY & NEUROLOGY

## 2024-05-10 PROCEDURE — G8428 CUR MEDS NOT DOCUMENT: HCPCS | Performed by: PSYCHIATRY & NEUROLOGY

## 2024-05-10 PROCEDURE — 4004F PT TOBACCO SCREEN RCVD TLK: CPT | Performed by: PSYCHIATRY & NEUROLOGY

## 2024-05-10 PROCEDURE — 99214 OFFICE O/P EST MOD 30 MIN: CPT | Performed by: PSYCHIATRY & NEUROLOGY

## 2024-05-10 RX ORDER — DOXEPIN HYDROCHLORIDE 25 MG/1
CAPSULE ORAL
COMMUNITY
Start: 2024-05-02

## 2024-05-10 RX ORDER — PRIMIDONE 50 MG/1
50 TABLET ORAL NIGHTLY
Qty: 30 TABLET | Refills: 5 | Status: SHIPPED | OUTPATIENT
Start: 2024-05-10

## 2024-05-10 RX ORDER — ROSUVASTATIN CALCIUM 5 MG/1
5 TABLET, COATED ORAL DAILY
COMMUNITY
Start: 2024-04-20

## 2024-05-10 RX ORDER — DONEPEZIL HYDROCHLORIDE 10 MG/1
10 TABLET, FILM COATED ORAL NIGHTLY
Qty: 30 TABLET | Refills: 5 | Status: SHIPPED | OUTPATIENT
Start: 2024-05-10

## 2024-05-10 RX ORDER — ERGOCALCIFEROL 1.25 MG/1
50000 CAPSULE ORAL WEEKLY
Qty: 12 CAPSULE | Refills: 1 | Status: SHIPPED | OUTPATIENT
Start: 2024-05-10

## 2024-05-10 NOTE — PROGRESS NOTES
MD Cyndee Masonluisana Vargas is a 53 y.o. female presenting as a follow patient for a   Chief Complaint   Patient presents with    Follow-up    Tremors      Moved from [place] to [place]   Onset of symptoms: 2 year   Progression:   Has been having memory loss - 2 years ago  Ended up getting into a car which was similar to her own car.   Ended up getting arrested for stealing  Was jailed for it  Was having severe anxiety and panic attacks at that time with boy friend issues.,   It has settled.       Has hx of schizhophrenia  Mood is better         Tremors are better  Not as shaky         Now:   Tremors: better  On primidone    Prev:   In hands.  Not in head/legs.   In rest and action  Worse with action.      Right handed.  Affects hand writing.         Stiffness: No  Gait/Walking difficulties: has lightheadedness from low bp   Cane, walker, wheelchair use: No  Falls: No  Mood/behavior: has anxiety- but controlled  Has schizophrenia- no hallucinations. Under good control with invega, risperidone     Hallucinations: No  Sleep: sleeps better  Using cpap  On ambien, doxepin and primidone     Slowness in daily activities: No  Slurred speech: No  Drooling of saliva: Yes: Comment: at night . Has no dentition  Swallowing difficulty: No  Memory loss: had learning disability and hwas a  with fetal alcohol syndrome      -does cognitive therapy. Speech therapy thinks it is better. Therapy once a week   -lives alone  -adl's- independent. Previously debilitated when polypharmacy interfering  -driving- arrested 2 years ago for stealing a car when she drove away with a car similar to hers.   -finances - keeps up with bills. In [place] has not had trouble. No billing issues or overdraft  On autopay   -cooking- no trouble. No accidents.   -medication: can remember to take her medications on time.            Family hx of dementia: mother in 60's      Testing done:  none  Date:   Family history of Parkinson's Disease:

## 2024-05-13 ENCOUNTER — OFFICE VISIT (OUTPATIENT)
Dept: SLEEP CENTER | Age: 54
End: 2024-05-13
Payer: COMMERCIAL

## 2024-05-13 VITALS
HEIGHT: 65 IN | DIASTOLIC BLOOD PRESSURE: 83 MMHG | BODY MASS INDEX: 40.62 KG/M2 | OXYGEN SATURATION: 95 % | WEIGHT: 243.83 LBS | HEART RATE: 120 BPM | SYSTOLIC BLOOD PRESSURE: 138 MMHG | RESPIRATION RATE: 16 BRPM

## 2024-05-13 DIAGNOSIS — G47.00 INSOMNIA, UNSPECIFIED TYPE: ICD-10-CM

## 2024-05-13 DIAGNOSIS — G47.33 OBSTRUCTIVE SLEEP APNEA: Primary | ICD-10-CM

## 2024-05-13 PROCEDURE — G8417 CALC BMI ABV UP PARAM F/U: HCPCS | Performed by: NURSE PRACTITIONER

## 2024-05-13 PROCEDURE — 99214 OFFICE O/P EST MOD 30 MIN: CPT | Performed by: NURSE PRACTITIONER

## 2024-05-13 PROCEDURE — 3017F COLORECTAL CA SCREEN DOC REV: CPT | Performed by: NURSE PRACTITIONER

## 2024-05-13 PROCEDURE — 4004F PT TOBACCO SCREEN RCVD TLK: CPT | Performed by: NURSE PRACTITIONER

## 2024-05-13 PROCEDURE — G8427 DOCREV CUR MEDS BY ELIG CLIN: HCPCS | Performed by: NURSE PRACTITIONER

## 2024-05-13 ASSESSMENT — SLEEP AND FATIGUE QUESTIONNAIRES
HOW LIKELY ARE YOU TO NOD OFF OR FALL ASLEEP WHILE LYING DOWN TO REST IN THE AFTERNOON WHEN CIRCUMSTANCES PERMIT: WOULD NEVER DOZE
HOW LIKELY ARE YOU TO NOD OFF OR FALL ASLEEP WHILE SITTING AND READING: WOULD NEVER DOZE
HOW LIKELY ARE YOU TO NOD OFF OR FALL ASLEEP WHEN YOU ARE A PASSENGER IN A CAR FOR AN HOUR WITHOUT A BREAK: WOULD NEVER DOZE
HOW LIKELY ARE YOU TO NOD OFF OR FALL ASLEEP IN A CAR, WHILE STOPPED FOR A FEW MINUTES IN TRAFFIC: WOULD NEVER DOZE
HOW LIKELY ARE YOU TO NOD OFF OR FALL ASLEEP WHILE SITTING QUIETLY AFTER LUNCH WITHOUT ALCOHOL: WOULD NEVER DOZE
HOW LIKELY ARE YOU TO NOD OFF OR FALL ASLEEP WHILE SITTING AND TALKING TO SOMEONE: WOULD NEVER DOZE
ESS TOTAL SCORE: 0
HOW LIKELY ARE YOU TO NOD OFF OR FALL ASLEEP WHILE WATCHING TV: WOULD NEVER DOZE
HOW LIKELY ARE YOU TO NOD OFF OR FALL ASLEEP WHILE SITTING INACTIVE IN A PUBLIC PLACE: WOULD NEVER DOZE

## 2024-05-13 NOTE — PROGRESS NOTES
[facility] Sleep Medicine    Patient Name: Maki Vargas  Age: 53 y.o.   : 1970    Date of Visit: 24        Review of Last Visit Summary:    The patient was last seen on 2023 for  Obstructive Sleep Apnea.    Interim History:     Maki Vargas is a 53 y.o. female that  has a past medical history of Abnormal Pap smear of cervix, ALT (SGPT) level raised, Anemia, Anxiety, Arthritis, Bronchitis, Complication of anesthesia, Depressive disorder, not elsewhere classified, Dietary surveillance and counseling, History of conization of cervix (3 times in [place]), conization of cervix complicating pregnancy (), Hyperlipidemia, Hypothyroidism, Leukocytosis, unspecified, Memory disorder, Nonspecific elevation of levels of transaminase or lactic acid dehydrogenase (LDH), Other abnormal blood chemistry, Overactive bladder, Pneumonia, and Sleep difficulties. She presents in follow up to Sleep Clinic to review CPAP adherence and efficacy.     Interval Events:    -Continues CPAP nightly, sleeping well. Denies AM headaches, excessive fatigue, or fragmented sleep.  -Continues taking Ambien 10 mg, prescribed by psychiatry, no reported adverse reactions.  -Nasal mask with memory foam, fits her well.  -Cleaning CPAP regularly.  -Pressure tolerable.  -Denies napping in the day.    DME:[facility]    Sleep Study History: 1.  2022-PSG- wt 190 lbs, did not sleep during study.    2.  3/15/2023-PSG- wt 230 lbs, AHI 15.9, REM AHI 28, SPO2 anette 82%, T <88% was 114 minutes of TST    3.  23-titration- wt 230 lbs, sleep efficiency 61%, REM sleep 30.8%, titration using CPAP of 11 resulted in an AHI of 0.0, SPO2 anette of 90% and average O2 sat of 91%.  This included supine REM sleep.    Sleep History:  Patient was ordered sleep studies through primary care provider due to ongoing sleep issues, nighttime awakenings, snoring, and poor sleep quality. Despite ongoing insomnia over the course of the last 1-2 years,

## 2024-05-16 ENCOUNTER — TREATMENT (OUTPATIENT)
Dept: SPEECH THERAPY | Age: 54
End: 2024-05-16
Payer: COMMERCIAL

## 2024-05-16 DIAGNOSIS — R41.841 COGNITIVE COMMUNICATION DEFICIT: Primary | ICD-10-CM

## 2024-05-16 LAB — GYNECOLOGY CYTOLOGY REPORT: NORMAL

## 2024-05-16 PROCEDURE — 92507 TX SP LANG VOICE COMM INDIV: CPT | Performed by: SPEECH-LANGUAGE PATHOLOGIST

## 2024-05-23 ENCOUNTER — TREATMENT (OUTPATIENT)
Dept: SPEECH THERAPY | Age: 54
End: 2024-05-23
Payer: COMMERCIAL

## 2024-05-23 DIAGNOSIS — R41.841 COGNITIVE COMMUNICATION DEFICIT: Primary | ICD-10-CM

## 2024-05-23 PROCEDURE — 92507 TX SP LANG VOICE COMM INDIV: CPT | Performed by: SPEECH-LANGUAGE PATHOLOGIST

## 2024-05-24 NOTE — PROGRESS NOTES
Patient seen for 45 minute in person session this date. Patient reports she is doing same.  Today, we worked on short term recall of short stories (2 paragraphs) presented verbally with a short delay added.  She completed the tasks with fair+/good performance with min occasional cues provided.  She felt good about her performance today!  Homework strongly encouraged.  Will continue. Ara Blank MA/CCC-SLP  [id number]  [facility] [id number]

## 2024-05-24 NOTE — PROGRESS NOTES
Patient seen for 45 minute in person session this date.  Patient doing fair overall per her report.  Reports that she did not practice her brain exercises.  Discussed finding games that she enjoys so that it doesn't feel like a chore to complete.  Today, we worked on short term recall of details of a short story presented verbally in sequential chunks of information.  She completed the tasks with fair/fair+ performance with min cues for repetition and cues to use visualization strategy to improve recall.  Homework encouraged.  Will continue. Ara Blank MA/CCC-SLP  [id number]  CPT [id number]

## 2024-05-24 NOTE — PROGRESS NOTES
Patient seen for 45 minute in person session this date. Patient report she is doing same.  States that she hasn't been doing a lot brain games so she was encouraged to work on some daily.  Today, we worked on short term recall of longer sentences presented verbally with a short delay added.  Patient completed the task with fair+ performance with min/mod cues/repetitions needed.  Homework encouraged.  Will continue.  Ara Blank MA/CCC-SLP  [id number]  [facility] [id number]

## 2024-05-30 ENCOUNTER — TREATMENT (OUTPATIENT)
Dept: SPEECH THERAPY | Age: 54
End: 2024-05-30
Payer: COMMERCIAL

## 2024-05-30 DIAGNOSIS — R41.841 COGNITIVE COMMUNICATION DEFICIT: Primary | ICD-10-CM

## 2024-05-30 PROCEDURE — 92507 TX SP LANG VOICE COMM INDIV: CPT | Performed by: SPEECH-LANGUAGE PATHOLOGIST

## 2024-05-31 ENCOUNTER — HOSPITAL ENCOUNTER (OUTPATIENT)
Dept: ULTRASOUND IMAGING | Age: 54
Discharge: HOME | End: 2024-05-31
Attending: LEGAL MEDICINE
Payer: COMMERCIAL

## 2024-05-31 DIAGNOSIS — N91.2 ABSENCE OF MENSTRUATION: ICD-10-CM

## 2024-05-31 DIAGNOSIS — N91.2 ABSENCE OF MENSTRUATION: Primary | ICD-10-CM

## 2024-05-31 PROCEDURE — 76856 US EXAM PELVIC COMPLETE: CPT

## 2024-05-31 NOTE — RESULT ENCOUNTER NOTE
Please call patient.  5/31/2024    Report shows lining of the uterus remains thin.  Left ovary not seen.  Normal right ovary.  For repeat ultrasound in 6 months to reassess the endometrial lining.        Thank you

## 2024-06-03 ENCOUNTER — TELEPHONE (OUTPATIENT)
Age: 54
End: 2024-06-03

## 2024-06-03 NOTE — TELEPHONE ENCOUNTER
----- Message from Erin Good MD sent at 5/31/2024 12:56 PM EDT -----  Please call patient.  5/31/2024    Report shows lining of the uterus remains thin.  Left ovary not seen.  Normal right ovary.  For repeat ultrasound in 6 months to reassess the endometrial lining.        Thank you

## 2024-06-11 NOTE — PROGRESS NOTES
Patient seen for 45 minute in person session this date.  Patient reports she is doing ok.  Continues to report that she has not been working on brain exercises.  Strongly encouraged to work on tasks daily if possible to maximize progress.  Today, we worked on short term recall of written ads.  She was provided with the ad visually for 1-2 min 'study' time.  She was then presented with a distractor and then asked specific questions about the ad.  She completed all tasks with fair+ performance with mod cues.  Will continue. Ara Blank MA/CCC-SLP  [id number]  CPT [id number]

## 2024-06-12 NOTE — PROGRESS NOTES
Patient seen for 45 minute in person session this date.  Patient reports doing fair.  Today, we worked on short term recall of short paragraph details presented verbally with a distractor added.  Patient completed all tasks with fair/fair+ performance with mod cues/repetitions needed.  Homework tasks encouraged.  Will continue.  Ara Blank MA/CCC-SLP  [id number]  CPT [id number]

## 2024-06-12 NOTE — PROGRESS NOTES
Patient seen for 45 minute in person session this date. Patient reports she is doing ok.  No significant issues occurring since last visit.  Today, we continued working on strategies to improve short term recall of mid-length paragraph details presented verbally with a delay added.  Patient completed all tasks today with fair performance with mod cues.  Homework activities encouraged.  Will continue. Ara Blank MA/CCC-SLP  [id number]  [facility] [address]

## 2024-06-12 NOTE — PROGRESS NOTES
Patient seen for 45 minute in person session this date. Patient reports she is doing ok.  Today, we worked on short term recall strategies implemented to recall details of a longer paragraph presented verbally with a delay added.  Patient completed tasks with fair/fair+ performance with min/mod cues.  She is improving despite not completing homework brain exercises as consistently as instructed to do so.  Will continue.  Ara Blank MA/CCC-SLP  [id number]  CPT [id number]

## 2024-06-12 NOTE — PROGRESS NOTES
Patient seen for 45 minute in person session this date.  Patient reports doing ok.  Reports very little homework practice despite instruction.  We discussed a variety of activities for her to try to help with brain exercising.  She stated she will try. Today, we continued working on short term recall of details of a mid-length paragraph provided verbally with a short delay added.  Patient completed the tasks with fair/fair+ performance with mod cues.  Will continue.  Ara Blank MA/CCC-SLP  [id number]  [facility] [id number]

## 2024-06-13 ENCOUNTER — TREATMENT (OUTPATIENT)
Dept: SPEECH THERAPY | Age: 54
End: 2024-06-13
Payer: COMMERCIAL

## 2024-06-13 DIAGNOSIS — R41.841 COGNITIVE COMMUNICATION DEFICIT: Primary | ICD-10-CM

## 2024-06-13 PROCEDURE — 92507 TX SP LANG VOICE COMM INDIV: CPT | Performed by: SPEECH-LANGUAGE PATHOLOGIST

## 2024-06-20 ENCOUNTER — TREATMENT (OUTPATIENT)
Dept: SPEECH THERAPY | Age: 54
End: 2024-06-20
Payer: COMMERCIAL

## 2024-06-20 DIAGNOSIS — R41.841 COGNITIVE COMMUNICATION DEFICIT: Primary | ICD-10-CM

## 2024-06-20 PROCEDURE — 92507 TX SP LANG VOICE COMM INDIV: CPT | Performed by: SPEECH-LANGUAGE PATHOLOGIST

## 2024-06-27 ENCOUNTER — TREATMENT (OUTPATIENT)
Dept: SPEECH THERAPY | Age: 54
End: 2024-06-27
Payer: COMMERCIAL

## 2024-06-27 DIAGNOSIS — R41.841 COGNITIVE COMMUNICATION DEFICIT: Primary | ICD-10-CM

## 2024-06-27 PROCEDURE — 92507 TX SP LANG VOICE COMM INDIV: CPT | Performed by: SPEECH-LANGUAGE PATHOLOGIST

## 2024-07-02 ENCOUNTER — HOSPITAL ENCOUNTER (OUTPATIENT)
Dept: MRI IMAGING | Age: 54
Discharge: HOME | End: 2024-07-04
Attending: OBSTETRICS & GYNECOLOGY
Payer: COMMERCIAL

## 2024-07-02 DIAGNOSIS — E22.1 IDIOPATHIC HYPERPROLACTINEMIA (HCC): ICD-10-CM

## 2024-07-02 PROCEDURE — A9577 INJ MULTIHANCE: HCPCS | Performed by: RADIOLOGY

## 2024-07-02 PROCEDURE — 70553 MRI BRAIN STEM W/O & W/DYE: CPT

## 2024-07-02 PROCEDURE — 6360000004 HC RX CONTRAST MEDICATION: Performed by: RADIOLOGY

## 2024-07-02 RX ADMIN — GADOBENATE DIMEGLUMINE 20 ML: 529 INJECTION, SOLUTION INTRAVENOUS at 19:19

## 2024-07-07 ENCOUNTER — HOSPITAL ENCOUNTER (EMERGENCY)
Age: 54
Discharge: HOME | End: 2024-07-07
Attending: EMERGENCY MEDICINE
Payer: COMMERCIAL

## 2024-07-07 ENCOUNTER — APPOINTMENT (OUTPATIENT)
Dept: GENERAL RADIOLOGY | Age: 54
End: 2024-07-07
Payer: COMMERCIAL

## 2024-07-07 VITALS
TEMPERATURE: 98.1 F | HEART RATE: 61 BPM | OXYGEN SATURATION: 96 % | SYSTOLIC BLOOD PRESSURE: 129 MMHG | RESPIRATION RATE: 17 BRPM | DIASTOLIC BLOOD PRESSURE: 87 MMHG

## 2024-07-07 DIAGNOSIS — R45.0 JITTERY FEELING: ICD-10-CM

## 2024-07-07 DIAGNOSIS — R00.2 PALPITATIONS: Primary | ICD-10-CM

## 2024-07-07 LAB
ALBUMIN SERPL-MCNC: 4 G/DL (ref 3.5–5.2)
ALP SERPL-CCNC: 97 U/L (ref 35–104)
ALT SERPL-CCNC: 19 U/L (ref 0–32)
ANION GAP SERPL CALCULATED.3IONS-SCNC: 14 MMOL/L (ref 7–16)
AST SERPL-CCNC: 23 U/L (ref 0–31)
BASOPHILS # BLD: 0 K/UL (ref 0–0.2)
BASOPHILS NFR BLD: 0 % (ref 0–2)
BILIRUB SERPL-MCNC: 0.2 MG/DL (ref 0–1.2)
BNP SERPL-MCNC: <36 PG/ML (ref 0–450)
BUN SERPL-MCNC: 9 MG/DL (ref 6–20)
CALCIUM SERPL-MCNC: 9.7 MG/DL (ref 8.6–10.2)
CHLORIDE SERPL-SCNC: 100 MMOL/L (ref 98–107)
CO2 SERPL-SCNC: 22 MMOL/L (ref 22–29)
CREAT SERPL-MCNC: 0.6 MG/DL (ref 0.5–1)
EKG ATRIAL RATE: 68 BPM
EKG P AXIS: 33 DEGREES
EKG P-R INTERVAL: 202 MS
EKG Q-T INTERVAL: 382 MS
EKG QRS DURATION: 78 MS
EKG QTC CALCULATION (BAZETT): 406 MS
EKG R AXIS: 28 DEGREES
EKG T AXIS: 53 DEGREES
EKG VENTRICULAR RATE: 68 BPM
EOSINOPHIL # BLD: 0.16 K/UL (ref 0.05–0.5)
EOSINOPHILS RELATIVE PERCENT: 1 % (ref 0–6)
ERYTHROCYTE [DISTWIDTH] IN BLOOD BY AUTOMATED COUNT: 12.2 % (ref 11.5–15)
GFR, ESTIMATED: >90 ML/MIN/1.73M2
GLUCOSE BLD-MCNC: 113 MG/DL (ref 74–99)
GLUCOSE SERPL-MCNC: 109 MG/DL (ref 74–99)
HCT VFR BLD AUTO: 45.3 % (ref 34–48)
HGB BLD-MCNC: 15.3 G/DL (ref 11.5–15.5)
LACTATE BLDV-SCNC: 1.4 MMOL/L (ref 0.5–2.2)
LIPASE SERPL-CCNC: 34 U/L (ref 13–60)
LYMPHOCYTES NFR BLD: 3.88 K/UL (ref 1.5–4)
LYMPHOCYTES RELATIVE PERCENT: 25 % (ref 20–42)
MAGNESIUM SERPL-MCNC: 2 MG/DL (ref 1.6–2.6)
MCH RBC QN AUTO: 31.9 PG (ref 26–35)
MCHC RBC AUTO-ENTMCNC: 33.8 G/DL (ref 32–34.5)
MCV RBC AUTO: 94.4 FL (ref 80–99.9)
MONOCYTES NFR BLD: 0.93 K/UL (ref 0.1–0.95)
MONOCYTES NFR BLD: 6 % (ref 2–12)
NEUTROPHILS NFR BLD: 68 % (ref 43–80)
NEUTS SEG NFR BLD: 10.54 K/UL (ref 1.8–7.3)
PLATELET # BLD AUTO: 379 K/UL (ref 130–450)
PMV BLD AUTO: 9.9 FL (ref 7–12)
POTASSIUM SERPL-SCNC: 4 MMOL/L (ref 3.5–5)
POTASSIUM SERPL-SCNC: 5.4 MMOL/L (ref 3.5–5)
PROT SERPL-MCNC: 8.2 G/DL (ref 6.4–8.3)
RBC # BLD AUTO: 4.8 M/UL (ref 3.5–5.5)
SODIUM SERPL-SCNC: 136 MMOL/L (ref 132–146)
T3FREE SERPL-MCNC: 1.68 PG/ML (ref 2–4.4)
T4 FREE SERPL-MCNC: 1 NG/DL (ref 0.9–1.7)
T4 SERPL-MCNC: 7 UG/DL (ref 4.5–11.7)
TROPONIN I SERPL HS-MCNC: <6 NG/L (ref 0–9)
TSH SERPL DL<=0.05 MIU/L-ACNC: 11.27 UIU/ML (ref 0.27–4.2)
TSH SERPL DL<=0.05 MIU/L-ACNC: 9.14 UIU/ML (ref 0.27–4.2)
WBC OTHER # BLD: 15.5 K/UL (ref 4.5–11.5)

## 2024-07-07 PROCEDURE — 84443 ASSAY THYROID STIM HORMONE: CPT

## 2024-07-07 PROCEDURE — 82962 GLUCOSE BLOOD TEST: CPT

## 2024-07-07 PROCEDURE — 84132 ASSAY OF SERUM POTASSIUM: CPT

## 2024-07-07 PROCEDURE — 93005 ELECTROCARDIOGRAM TRACING: CPT | Performed by: PHYSICIAN ASSISTANT

## 2024-07-07 PROCEDURE — 99285 EMERGENCY DEPT VISIT HI MDM: CPT

## 2024-07-07 PROCEDURE — 85025 COMPLETE CBC W/AUTO DIFF WBC: CPT

## 2024-07-07 PROCEDURE — 83735 ASSAY OF MAGNESIUM: CPT

## 2024-07-07 PROCEDURE — 84439 ASSAY OF FREE THYROXINE: CPT

## 2024-07-07 PROCEDURE — 2580000003 HC RX 258

## 2024-07-07 PROCEDURE — 83880 ASSAY OF NATRIURETIC PEPTIDE: CPT

## 2024-07-07 PROCEDURE — 93010 ELECTROCARDIOGRAM REPORT: CPT | Performed by: INTERNAL MEDICINE

## 2024-07-07 PROCEDURE — 84484 ASSAY OF TROPONIN QUANT: CPT

## 2024-07-07 PROCEDURE — 83690 ASSAY OF LIPASE: CPT

## 2024-07-07 PROCEDURE — 80053 COMPREHEN METABOLIC PANEL: CPT

## 2024-07-07 PROCEDURE — 84481 FREE ASSAY (FT-3): CPT

## 2024-07-07 PROCEDURE — 83605 ASSAY OF LACTIC ACID: CPT

## 2024-07-07 PROCEDURE — 71045 X-RAY EXAM CHEST 1 VIEW: CPT

## 2024-07-07 RX ORDER — SODIUM CHLORIDE 9 MG/ML
INJECTION, SOLUTION INTRAMUSCULAR; INTRAVENOUS; SUBCUTANEOUS
Status: DISCONTINUED
Start: 2024-07-07 | End: 2024-07-07 | Stop reason: HOSPADM

## 2024-07-07 RX ORDER — 0.9 % SODIUM CHLORIDE 0.9 %
1000 INTRAVENOUS SOLUTION INTRAVENOUS ONCE
Status: COMPLETED | OUTPATIENT
Start: 2024-07-07 | End: 2024-07-07

## 2024-07-07 RX ADMIN — SODIUM CHLORIDE 1000 ML: 9 INJECTION, SOLUTION INTRAVENOUS at 13:29

## 2024-07-07 NOTE — DISCHARGE INSTRUCTIONS
Thank you for the opportunity to serve in your medical care today. Please be sure to take your prescribed medication as directed. Follow up with your doctor is critical for optimal healing. Should you have any new or worsening symptoms, please return to the Emergency Department for further work-up and evaluation.

## 2024-07-07 NOTE — ED PROVIDER NOTES
1.2 mg/dL    Alkaline Phosphatase 97 35 - 104 U/L    ALT 19 0 - 32 U/L    AST 23 0 - 31 U/L   TSH   Result Value Ref Range    TSH 11.27 (H) 0.27 - 4.20 uIU/mL   T4   Result Value Ref Range    Thyroxine (T4) 7.0 4.5 - 11.7 ug/dL   Magnesium   Result Value Ref Range    Magnesium 2.0 1.6 - 2.6 mg/dL   Lipase   Result Value Ref Range    Lipase 34 13 - 60 U/L   Lactic Acid   Result Value Ref Range    Lactic Acid 1.4 0.5 - 2.2 mmol/L   Brain Natriuretic Peptide   Result Value Ref Range    Pro-BNP <36 0 - 450 pg/mL   Troponin   Result Value Ref Range    Troponin, High Sensitivity <6 0 - 9 ng/L   Potassium   Result Value Ref Range    Potassium 4.0 3.5 - 5.0 mmol/L   TSH   Result Value Ref Range    TSH 9.14 (H) 0.27 - 4.20 uIU/mL   T4, Free   Result Value Ref Range    T4 Free 1.0 0.9 - 1.7 ng/dL   POCT Glucose   Result Value Ref Range    POC Glucose 113 (H) 74 - 99 mg/dL   EKG 12 Lead   Result Value Ref Range    Ventricular Rate 68 BPM    Atrial Rate 68 BPM    P-R Interval 202 ms    QRS Duration 78 ms    Q-T Interval 382 ms    QTc Calculation (Bazett) 406 ms    P Axis 33 degrees    R Axis 28 degrees    T Axis 53 degrees       RADIOLOGY:   Interpretation per the Radiologist below, if available at the time of this note:    XR CHEST PORTABLE   Final Result   No acute process.           MRI BRAIN W WO CONTRAST    Result Date: 7/3/2024  EXAMINATION: MRI OF THE BRAIN WITHOUT AND WITH CONTRAST 7/2/2024 6:32 pm TECHNIQUE: Multiplanar multisequence MRI of the head/brain was performed without and with the administration of intravenous contrast. COMPARISON: MRI brain dated 02/17/2024 HISTORY: ORDERING SYSTEM PROVIDED HISTORY: Idiopathic hyperprolactinemia (HCC) TECHNOLOGIST PROVIDED HISTORY: STAT Creatinine as needed:->No What is the sedation requirement?->None FINDINGS: INTRACRANIAL STRUCTURES/VENTRICLES:  No acute abnormality is seen within the sellar/suprasellar regions. The pituitary gland is symmetric and shows slightly

## 2024-07-07 NOTE — ED NOTES
Department of Emergency Medicine  FIRST PROVIDER TRIAGE NOTE             Independent MLP           7/7/24  10:47 AM EDT    Date of Encounter: 7/7/24   MRN: [id number]      HPI: Maki Vargas is a 53 y.o. female who presents to the ED for Palpitations (Pt c/o palpitations and nausea that started yesterday. )   \"Racing heartbeats\" which is making her nauseous. Reports increased energy as well. Started yesterday and constant however does not feel it now. Mild heartburn sensation. No SOB. History of hyperthyroid. Reports high TSH-recently had pituitary scan.     ROS: Negative for abd pain or back pain.    PE: Gen Appearance/Constitutional: alert  Musculoskeletal: moves all extremities x 4    Initial Plan of Care: All treatment areas with department are currently occupied.      Plan to order/Initiate the following while awaiting opening in ED: Triage evaluation  .     Provider-Patient relationship only established for Provider In Triage (PIT).  Full assessment, HPI and examination not performed, therefore, it is not yet possible to state whether or not an emergency medical condition exists     Initial Plan of Care: Initiate Treatment-Testing, Proceed toTreatment Area When Bed Available for ED Attending/MLP to Continue Care  Secondary to high volume, low staffing, and/or boarding- patient to await bed availability.     This ends my PIT-Patient relationship.  Care of patient relinquished after triage         Electronically signed by MICHELINE Galvin   DD: 7/7/24       Kath Vogt PA  07/07/24 1043

## 2024-07-11 ENCOUNTER — TELEPHONE (OUTPATIENT)
Dept: SPEECH THERAPY | Age: 54
End: 2024-07-11

## 2024-07-11 NOTE — PROGRESS NOTES
Patient seen for 45 minute in person session this date. Patient reports no changes and states she is doing ok.  Today, we worked on short term recall of longer sentences presented verbally with a short delay added. Patient completed the tasks with fair/fair+ performance with min cues.  She continues to do fair+ with paraphrasing but has difficulty with verbatim recall of the sentences.   Homework tasks encouraged.  Will continue. Ara Blank MA/CCC-SLP  [id number]  CPT [id number]

## 2024-07-11 NOTE — TELEPHONE ENCOUNTER
Patient was a no show for her scheduled cognitive therapy appointment this date.  Ara Blank MA/CCC-SLP  [id number]

## 2024-07-11 NOTE — PROGRESS NOTES
Patient seen for 45 minute in person session this date.  Patient reports no issues since last session. Today, we switched gears a bit and worked on topic maintenance during structured conversation tasks.  She completed all tasks with fair+/good performance with only a few episodes of tangential behavior.  However, she was easily redirected back to main topic which she was able to easily recall.  Homework activities encouraged.  Will continue.  Ara Blank MA/CCC-SLP  [id number]  [facility] [id number]

## 2024-07-11 NOTE — PROGRESS NOTES
Patient seen for 45 minute in person session this date. Patient reports she is ok.  Today, patient informed this therapist that she is having an MRI and blood work to r/o possible issues with her pituitary gland.  She stated that she is nervous but hoping it gives her some answers about health issues she is experiencing.  Today, we continued working on short term recall of details of a paragraph/story presented verbally with a short delay added.  She completed these tasks with fair+ performance with min/mod cues.  Homework tasks encouraged.  Will continue. Ara Blank MA/CCC-SLP  [id number]  CPT [id number]

## 2024-07-17 ENCOUNTER — TELEPHONE (OUTPATIENT)
Dept: ENDOCRINOLOGY | Age: 54
End: 2024-07-17

## 2024-07-17 RX ORDER — LEVOTHYROXINE SODIUM 0.12 MG/1
125 TABLET ORAL DAILY
Qty: 30 TABLET | Refills: 5 | Status: SHIPPED | OUTPATIENT
Start: 2024-07-17

## 2024-07-17 NOTE — TELEPHONE ENCOUNTER
If pt is taking LT4 112 mcg daily please have her increase to 125 mcg daily .  Please send me Rx an I will send

## 2024-07-17 NOTE — TELEPHONE ENCOUNTER
Rachel from [facility]  Dr Timmons stated her THS was  9.14 wanted to know if her medication dose needs changed,  or we need to see her sooner then 3/13/2025    Rachel  [phone]  ext  [id number]

## 2024-07-18 ENCOUNTER — TREATMENT (OUTPATIENT)
Dept: SPEECH THERAPY | Age: 54
End: 2024-07-18
Payer: COMMERCIAL

## 2024-07-18 DIAGNOSIS — R41.841 COGNITIVE COMMUNICATION DEFICIT: Primary | ICD-10-CM

## 2024-07-18 PROCEDURE — 92507 TX SP LANG VOICE COMM INDIV: CPT | Performed by: SPEECH-LANGUAGE PATHOLOGIST

## 2024-07-19 ENCOUNTER — TRANSCRIBE ORDERS (OUTPATIENT)
Dept: SLEEP CENTER | Age: 54
End: 2024-07-19

## 2024-07-19 DIAGNOSIS — R00.0 TACHYCARDIA, UNSPECIFIED: Primary | ICD-10-CM

## 2024-07-25 ENCOUNTER — TREATMENT (OUTPATIENT)
Dept: SPEECH THERAPY | Age: 54
End: 2024-07-25
Payer: COMMERCIAL

## 2024-07-25 DIAGNOSIS — R41.841 COGNITIVE COMMUNICATION DEFICIT: Primary | ICD-10-CM

## 2024-07-25 PROCEDURE — 92507 TX SP LANG VOICE COMM INDIV: CPT | Performed by: SPEECH-LANGUAGE PATHOLOGIST

## 2024-08-01 ENCOUNTER — TREATMENT (OUTPATIENT)
Dept: SPEECH THERAPY | Age: 54
End: 2024-08-01
Payer: COMMERCIAL

## 2024-08-01 DIAGNOSIS — R41.841 COGNITIVE COMMUNICATION DEFICIT: Primary | ICD-10-CM

## 2024-08-01 PROCEDURE — 92507 TX SP LANG VOICE COMM INDIV: CPT | Performed by: SPEECH-LANGUAGE PATHOLOGIST

## 2024-08-05 ENCOUNTER — HOSPITAL ENCOUNTER (OUTPATIENT)
Dept: SLEEP CENTER | Age: 54
Discharge: HOME | End: 2024-08-05
Payer: COMMERCIAL

## 2024-08-05 DIAGNOSIS — R00.0 TACHYCARDIA, UNSPECIFIED: ICD-10-CM

## 2024-08-05 PROCEDURE — 93225 XTRNL ECG REC<48 HRS REC: CPT

## 2024-08-08 ENCOUNTER — TREATMENT (OUTPATIENT)
Dept: SPEECH THERAPY | Age: 54
End: 2024-08-08

## 2024-08-08 DIAGNOSIS — R41.841 COGNITIVE COMMUNICATION DEFICIT: Primary | ICD-10-CM

## 2024-08-12 PROBLEM — E66.01 MORBID OBESITY (HCC): Status: ACTIVE | Noted: 2018-11-05

## 2024-08-15 ENCOUNTER — TREATMENT (OUTPATIENT)
Dept: SPEECH THERAPY | Age: 54
End: 2024-08-15

## 2024-08-15 DIAGNOSIS — R41.841 COGNITIVE COMMUNICATION DEFICIT: Primary | ICD-10-CM

## 2024-08-22 ENCOUNTER — TREATMENT (OUTPATIENT)
Dept: SPEECH THERAPY | Age: 54
End: 2024-08-22

## 2024-08-22 DIAGNOSIS — R41.841 COGNITIVE COMMUNICATION DEFICIT: Primary | ICD-10-CM

## 2024-08-27 NOTE — PROGRESS NOTES
Patient seen for 45 minute in person session this date. Patient doing same per her report.  Today, we worked on short term recall of a list of words presented verbally using the alphabet as a strategy to help with recall.  She completed the tasks with fair/fair+ performance with min cues only needed.  Homework encouraged.  Will continue. Ara Blank MA/CCC-SLP  [id number]  [facility] [id number]

## 2024-08-27 NOTE — PROGRESS NOTES
Patient seen for 45 minute in person session this date. Patient report she is doing well.  No new issues and reports she has been recalling her appointments without difficulty.  Today, we worked on short term recall of long sentences presented verbally with a longer delay/distraction present.  She completed all tasks with fair/fair+ performance with min/mod cues needed.  Homework encouraged.  Will continue. Ara Blank MA/CCC-SLP  [id number]  CPT [id number]

## 2024-08-27 NOTE — PROGRESS NOTES
Patient seen for 45 minute in person session this date. Patient reports no changes.  Today, we worked on short term recall of large list of words in random categories presented visually.  Patient was able to recall them with fair+ performance with min/mod cues.  Patient reports that she missed last weeks session due to confusion about the dates and what day it is currently.  We discussed strategies to use to help with orientation and recall of appointments.  She will implement.  Homework encouraged.  Will continue. Ara Blank MA/CCC-SLP  [id number]  [facility] [address]

## 2024-08-29 ENCOUNTER — TREATMENT (OUTPATIENT)
Dept: SPEECH THERAPY | Age: 54
End: 2024-08-29

## 2024-08-29 DIAGNOSIS — R41.841 COGNITIVE COMMUNICATION DEFICIT: Primary | ICD-10-CM

## 2024-09-05 ENCOUNTER — TREATMENT (OUTPATIENT)
Dept: SPEECH THERAPY | Age: 54
End: 2024-09-05

## 2024-09-05 DIAGNOSIS — R41.841 COGNITIVE COMMUNICATION DEFICIT: Primary | ICD-10-CM

## 2024-09-12 ENCOUNTER — TREATMENT (OUTPATIENT)
Dept: SPEECH THERAPY | Age: 54
End: 2024-09-12

## 2024-09-12 DIAGNOSIS — R41.841 COGNITIVE COMMUNICATION DEFICIT: Primary | ICD-10-CM

## 2024-09-26 ENCOUNTER — TREATMENT (OUTPATIENT)
Dept: SPEECH THERAPY | Age: 54
End: 2024-09-26
Payer: COMMERCIAL

## 2024-09-26 DIAGNOSIS — R41.841 COGNITIVE COMMUNICATION DEFICIT: Primary | ICD-10-CM

## 2024-09-26 PROCEDURE — 92507 TX SP LANG VOICE COMM INDIV: CPT | Performed by: SPEECH-LANGUAGE PATHOLOGIST

## 2024-10-03 ENCOUNTER — TREATMENT (OUTPATIENT)
Dept: SPEECH THERAPY | Age: 54
End: 2024-10-03

## 2024-10-03 DIAGNOSIS — R41.841 COGNITIVE COMMUNICATION DEFICIT: Primary | ICD-10-CM

## 2024-10-10 ENCOUNTER — TREATMENT (OUTPATIENT)
Dept: SPEECH THERAPY | Age: 54
End: 2024-10-10
Payer: COMMERCIAL

## 2024-10-10 DIAGNOSIS — R41.841 COGNITIVE COMMUNICATION DEFICIT: Primary | ICD-10-CM

## 2024-10-10 PROCEDURE — 92507 TX SP LANG VOICE COMM INDIV: CPT | Performed by: SPEECH-LANGUAGE PATHOLOGIST

## 2024-10-24 ENCOUNTER — TREATMENT (OUTPATIENT)
Dept: SPEECH THERAPY | Age: 54
End: 2024-10-24

## 2024-10-24 DIAGNOSIS — R41.841 COGNITIVE COMMUNICATION DEFICIT: Primary | ICD-10-CM

## 2024-10-30 NOTE — PROGRESS NOTES
Patient seen for 45 minute in person session this date.  Patient reports no significant issues since last session.  Today, we worked on short term recall of 3 and 4 items in a sentence presented verbally with a short delay added.  Patient completed the tasks with fair+ performance with min/mod cues.  We reviewed need for implementation of visualization and/or chunking strategies to help with recall.  Homework encouraged daily.  Will continue. Ara Blank MA/CCC-SLP  [id number]  [facility] [id number]

## 2024-10-31 ENCOUNTER — TREATMENT (OUTPATIENT)
Dept: SPEECH THERAPY | Age: 54
End: 2024-10-31
Payer: COMMERCIAL

## 2024-10-31 DIAGNOSIS — R41.841 COGNITIVE COMMUNICATION DEFICIT: Primary | ICD-10-CM

## 2024-10-31 PROCEDURE — 92507 TX SP LANG VOICE COMM INDIV: CPT | Performed by: SPEECH-LANGUAGE PATHOLOGIST

## 2024-11-11 ENCOUNTER — OFFICE VISIT (OUTPATIENT)
Age: 54
End: 2024-11-11
Payer: COMMERCIAL

## 2024-11-11 VITALS
SYSTOLIC BLOOD PRESSURE: 118 MMHG | DIASTOLIC BLOOD PRESSURE: 82 MMHG | WEIGHT: 251.3 LBS | BODY MASS INDEX: 41.87 KG/M2 | HEART RATE: 78 BPM | HEIGHT: 65 IN | OXYGEN SATURATION: 98 %

## 2024-11-11 DIAGNOSIS — E53.8 VITAMIN B12 DEFICIENCY: ICD-10-CM

## 2024-11-11 DIAGNOSIS — Z86.59 PERSONAL HISTORY OF SCHIZOPHRENIA: ICD-10-CM

## 2024-11-11 DIAGNOSIS — G31.84 AMNESTIC MCI (MILD COGNITIVE IMPAIRMENT WITH MEMORY LOSS): Primary | ICD-10-CM

## 2024-11-11 DIAGNOSIS — G25.1 DRUG-INDUCED TREMOR: ICD-10-CM

## 2024-11-11 DIAGNOSIS — E55.9 VITAMIN D DEFICIENCY: ICD-10-CM

## 2024-11-11 PROCEDURE — 3017F COLORECTAL CA SCREEN DOC REV: CPT | Performed by: PSYCHIATRY & NEUROLOGY

## 2024-11-11 PROCEDURE — G8484 FLU IMMUNIZE NO ADMIN: HCPCS | Performed by: PSYCHIATRY & NEUROLOGY

## 2024-11-11 PROCEDURE — G8417 CALC BMI ABV UP PARAM F/U: HCPCS | Performed by: PSYCHIATRY & NEUROLOGY

## 2024-11-11 PROCEDURE — 99214 OFFICE O/P EST MOD 30 MIN: CPT | Performed by: PSYCHIATRY & NEUROLOGY

## 2024-11-11 PROCEDURE — 4004F PT TOBACCO SCREEN RCVD TLK: CPT | Performed by: PSYCHIATRY & NEUROLOGY

## 2024-11-11 PROCEDURE — G8427 DOCREV CUR MEDS BY ELIG CLIN: HCPCS | Performed by: PSYCHIATRY & NEUROLOGY

## 2024-11-11 RX ORDER — DONEPEZIL HYDROCHLORIDE 10 MG/1
10 TABLET, FILM COATED ORAL NIGHTLY
Qty: 90 TABLET | Refills: 1 | Status: SHIPPED | OUTPATIENT
Start: 2024-11-11

## 2024-11-11 RX ORDER — PRIMIDONE 50 MG/1
50 TABLET ORAL NIGHTLY
Qty: 90 TABLET | Refills: 1 | Status: SHIPPED | OUTPATIENT
Start: 2024-11-11

## 2024-11-11 RX ORDER — ERGOCALCIFEROL 1.25 MG/1
50000 CAPSULE, LIQUID FILLED ORAL WEEKLY
Qty: 12 CAPSULE | Refills: 1 | Status: SHIPPED | OUTPATIENT
Start: 2024-11-11

## 2024-11-11 NOTE — PROGRESS NOTES
shots as well as Risperdal from her psychiatrist  She is also taking Cogentin    5. Drug-induced tremor  No evidence of tremors seen on exam  No evidence of parkinsonism from her schizophrenia medication  Suspect drug-induced tremors  Stable on primidone 50 mg nightly as well as benztropine      Darshana Menjivar MD

## 2024-11-14 ENCOUNTER — TREATMENT (OUTPATIENT)
Dept: SPEECH THERAPY | Age: 54
End: 2024-11-14

## 2024-11-14 DIAGNOSIS — R41.841 COGNITIVE COMMUNICATION DEFICIT: Primary | ICD-10-CM

## 2024-11-21 ENCOUNTER — TREATMENT (OUTPATIENT)
Dept: SPEECH THERAPY | Age: 54
End: 2024-11-21

## 2024-11-21 DIAGNOSIS — R41.841 COGNITIVE COMMUNICATION DEFICIT: Primary | ICD-10-CM

## 2024-12-03 NOTE — PROGRESS NOTES
Patient seen for 45 minute in person session this date. Patient reports she is doing ok.  She states that she is down to smoking 1/2 pack a day.  Strongly encouraged her to continue working on quitting.  She agreed.  Today, we continued working on short term recall of 5 details in a sentence provided verbally with a short  delay added.  Patient completed the tasks with fair/fair+ performance with min cues and repetitions.  More focused today on tasks with good use of strategies to improve recall.  Homework tasks encouraged daily.  Will continue. Ara Blank MA/CCC-SLP  [id number]  [facility] [id number]

## 2024-12-03 NOTE — PROGRESS NOTES
Patient seen for 45 minute in person session this date. Patient reports that she did call her doctor and she is scheduled to see him in November for refills.  Today, we worked on short term recall of information provided verbally in sentences in groups of 2.  Patient completed the tasks with fair/fair+ performance with mod cues needed.  Homework tasks encouraged. Will continue.  Ara Blank MA/CCC-SLP  [id number]  [facility] [id number]

## 2024-12-03 NOTE — PROGRESS NOTES
Patient seen for 45 minute in person session this date. Patient reports she is ok.  She states that she has been sleeping better lately.  Today, we worked on short term recall/thought organization with following 4 step commands presented verbally with a short delay added.  Patient completed the tasks with fair/fair+ performance with min/mod cues.  Homework activities discussed and encouraged.  Will continue. Ara Blank MA/CCC-SLP  [id number]  [facility] [id number]

## 2024-12-03 NOTE — PROGRESS NOTES
Patient seen for 45 minute in person session this date. Patient reports she is doing ok.  She states that she has stopped taking her memory medication because she ran out and doesn't see her doctor for a while.  She was instructed to call his office today and ask for refills so she doesn't let too much time pass before resuming. She agreed.  Today, we attempted to re-test cognitive skills via the RIPA-2.  However, patient was struggling quite a bit with the tasks and scores reflected decreased performance in all areas.  We discussed that this is likely due to her not taking her memory medications.  We agreed to wait to re-test once she resumes her medication. Today, we continued working on short term recall of 4 step commands with short delay added.  She completed wit fair performance today with mod cues needed.  Homework tasks encouraged.  Will continue. Ara Blank MA/CCC-SLP  [id number]  [facility] [id number]

## 2024-12-12 ENCOUNTER — TREATMENT (OUTPATIENT)
Dept: SPEECH THERAPY | Age: 54
End: 2024-12-12

## 2024-12-12 DIAGNOSIS — R41.841 COGNITIVE COMMUNICATION DEFICIT: Primary | ICD-10-CM

## 2024-12-19 ENCOUNTER — TREATMENT (OUTPATIENT)
Dept: SPEECH THERAPY | Age: 54
End: 2024-12-19

## 2024-12-19 DIAGNOSIS — R41.841 COGNITIVE COMMUNICATION DEFICIT: Primary | ICD-10-CM

## 2024-12-20 ENCOUNTER — OFFICE VISIT (OUTPATIENT)
Dept: CARDIOLOGY CLINIC | Age: 54
End: 2024-12-20
Payer: COMMERCIAL

## 2024-12-20 VITALS
DIASTOLIC BLOOD PRESSURE: 70 MMHG | WEIGHT: 249 LBS | RESPIRATION RATE: 16 BRPM | HEIGHT: 65 IN | BODY MASS INDEX: 41.48 KG/M2 | HEART RATE: 57 BPM | SYSTOLIC BLOOD PRESSURE: 110 MMHG

## 2024-12-20 DIAGNOSIS — G89.4 CHRONIC PAIN SYNDROME: ICD-10-CM

## 2024-12-20 DIAGNOSIS — F09 COGNITIVE DYSFUNCTION: ICD-10-CM

## 2024-12-20 DIAGNOSIS — R00.2 PALPITATIONS: Primary | ICD-10-CM

## 2024-12-20 DIAGNOSIS — E66.01 MORBID OBESITY: ICD-10-CM

## 2024-12-20 DIAGNOSIS — J44.9 CHRONIC OBSTRUCTIVE PULMONARY DISEASE, UNSPECIFIED COPD TYPE (HCC): ICD-10-CM

## 2024-12-20 PROCEDURE — 4004F PT TOBACCO SCREEN RCVD TLK: CPT | Performed by: INTERNAL MEDICINE

## 2024-12-20 PROCEDURE — G8484 FLU IMMUNIZE NO ADMIN: HCPCS | Performed by: INTERNAL MEDICINE

## 2024-12-20 PROCEDURE — 93000 ELECTROCARDIOGRAM COMPLETE: CPT | Performed by: INTERNAL MEDICINE

## 2024-12-20 PROCEDURE — G8427 DOCREV CUR MEDS BY ELIG CLIN: HCPCS | Performed by: INTERNAL MEDICINE

## 2024-12-20 PROCEDURE — 3017F COLORECTAL CA SCREEN DOC REV: CPT | Performed by: INTERNAL MEDICINE

## 2024-12-20 PROCEDURE — G8417 CALC BMI ABV UP PARAM F/U: HCPCS | Performed by: INTERNAL MEDICINE

## 2024-12-20 PROCEDURE — 99215 OFFICE O/P EST HI 40 MIN: CPT | Performed by: INTERNAL MEDICINE

## 2024-12-20 PROCEDURE — 3023F SPIROM DOC REV: CPT | Performed by: INTERNAL MEDICINE

## 2024-12-20 NOTE — PROGRESS NOTES
OUTPATIENT CARDIOLOGY FOLLOW-UP    Name: Maki Vargas    Age: 54 y.o.    Primary Care Physician: Roddy Timmons, APRN - CNP    Date of Service: 12/20/2024    Chief Complaint: Palpitations, chronic obstructive lung disease, chronic pain syndrome, morbid obesity, cognitive dysfunction    Interim History: Since prior evaluation of approximately 30 months earlier, the patient presents following the development of palpitations several months earlier described as the sensation of a rapid heartbeat lasting hours in duration and without additional arrhythmia related symptoms.  At that time, she was evaluated in the emergency room with a resting electrocardiogram reviewed at time of evaluation demonstrating evidence of sinus rhythm and otherwise being unremarkable and a chest x-ray again reviewed demonstrating no evidence of cardiomegaly or infiltrate and with normal high-sensitivity troponin levels and a proBNP level as well as the absence of metabolic derangements.  She has subsequently undergone arrhythmia monitoring demonstrating evidence of sinus rhythm with no identified arrhythmias and with resolution of symptoms of the time of her evaluation.  She continues active tobacco consumption in excess of 1/2 pack of cigarettes on a daily basis with persistent mild (functional class II) exertional dyspnea in the absence of additional manifestations of decompensated left ventricular systolic dysfunction or volume overload nor significant changes of her functional capabilities.  In the interim from her most recent evaluation she has lost approximately 20 pounds and remains normotensive.    Review of Systems:   The remainder of a complete multisystem review including consitutional, central nervous, respiratory, circulatory, gastrointestinal, genitourinary, endocrinologic, hematologic, musculoskeletal and psychiatric are negative.    Past Medical History:  Past Medical History:   Diagnosis Date    Abnormal Pap

## 2025-01-02 ENCOUNTER — TREATMENT (OUTPATIENT)
Dept: SPEECH THERAPY | Age: 55
End: 2025-01-02
Payer: COMMERCIAL

## 2025-01-02 DIAGNOSIS — R41.841 COGNITIVE COMMUNICATION DEFICIT: Primary | ICD-10-CM

## 2025-01-02 PROCEDURE — 92507 TX SP LANG VOICE COMM INDIV: CPT | Performed by: SPEECH-LANGUAGE PATHOLOGIST

## 2025-01-02 NOTE — PROGRESS NOTES
Patient seen for 45 minute in person session this date. Patient reports doing ok.  She was able to recall most of details from this past holiday's activities.  Today, patient revealed that she has not been sleeping well.  She ran out of her sleep medication and reports sleeping only for a few hours/night. We discussed the impact of poor sleep on cognitive skills.  She expressed understanding but reports that even with the sleep medication, she does not get a full night's sleep. Encouraged her to discuss this with her physician for alternative treatments.  Today, we worked on short term recall of details of a short paragraph presented verbally with delay/distraction present.  Patient completed the tasks with fair/fair+ performance with min/mod cues needed.  Encouraged homework activities.  Will continue. Ara Blank MA/CCC-SLP  [id number]  [facility] [id number]

## 2025-01-09 ENCOUNTER — TELEPHONE (OUTPATIENT)
Dept: SPEECH THERAPY | Age: 55
End: 2025-01-09

## 2025-01-09 NOTE — TELEPHONE ENCOUNTER
Patient cancelled therapy session this date due to no insurance auth.    Ara Blank MA/CCC-SLP  [id number]

## 2025-02-13 ENCOUNTER — APPOINTMENT (OUTPATIENT)
Dept: GENERAL RADIOLOGY | Age: 55
End: 2025-02-13
Payer: COMMERCIAL

## 2025-02-13 ENCOUNTER — HOSPITAL ENCOUNTER (EMERGENCY)
Age: 55
Discharge: LEFT BEFORE DISCHARGE | End: 2025-02-13
Attending: STUDENT IN AN ORGANIZED HEALTH CARE EDUCATION/TRAINING PROGRAM
Payer: COMMERCIAL

## 2025-02-13 VITALS
RESPIRATION RATE: 18 BRPM | SYSTOLIC BLOOD PRESSURE: 180 MMHG | OXYGEN SATURATION: 96 % | TEMPERATURE: 100.8 F | DIASTOLIC BLOOD PRESSURE: 90 MMHG | HEART RATE: 111 BPM

## 2025-02-13 DIAGNOSIS — U07.1 COVID-19: Primary | ICD-10-CM

## 2025-02-13 DIAGNOSIS — Z53.29 LEFT AGAINST MEDICAL ADVICE: ICD-10-CM

## 2025-02-13 DIAGNOSIS — B34.9 VIRAL ILLNESS: ICD-10-CM

## 2025-02-13 LAB
ALBUMIN SERPL-MCNC: 4 G/DL (ref 3.5–5.2)
ALP SERPL-CCNC: 109 U/L (ref 35–104)
ALT SERPL-CCNC: 40 U/L (ref 0–32)
ANION GAP SERPL CALCULATED.3IONS-SCNC: 15 MMOL/L (ref 7–16)
AST SERPL-CCNC: 32 U/L (ref 0–31)
BILIRUB SERPL-MCNC: 0.4 MG/DL (ref 0–1.2)
BNP SERPL-MCNC: 59 PG/ML (ref 0–450)
BUN SERPL-MCNC: 8 MG/DL (ref 6–20)
CALCIUM SERPL-MCNC: 9.2 MG/DL (ref 8.6–10.2)
CHLORIDE SERPL-SCNC: 99 MMOL/L (ref 98–107)
CO2 SERPL-SCNC: 21 MMOL/L (ref 22–29)
CREAT SERPL-MCNC: 0.6 MG/DL (ref 0.5–1)
FLUAV RNA RESP QL NAA+PROBE: NOT DETECTED
FLUBV RNA RESP QL NAA+PROBE: NOT DETECTED
GFR, ESTIMATED: >90 ML/MIN/1.73M2
GLUCOSE SERPL-MCNC: 108 MG/DL (ref 74–99)
POTASSIUM SERPL-SCNC: 5.3 MMOL/L (ref 3.5–5)
PROT SERPL-MCNC: 8.1 G/DL (ref 6.4–8.3)
SARS-COV-2 RNA RESP QL NAA+PROBE: DETECTED
SODIUM SERPL-SCNC: 135 MMOL/L (ref 132–146)
SOURCE: ABNORMAL
SPECIMEN DESCRIPTION: ABNORMAL

## 2025-02-13 PROCEDURE — 87636 SARSCOV2 & INF A&B AMP PRB: CPT

## 2025-02-13 PROCEDURE — 83880 ASSAY OF NATRIURETIC PEPTIDE: CPT

## 2025-02-13 PROCEDURE — 80053 COMPREHEN METABOLIC PANEL: CPT

## 2025-02-13 PROCEDURE — 99285 EMERGENCY DEPT VISIT HI MDM: CPT

## 2025-02-13 PROCEDURE — 71046 X-RAY EXAM CHEST 2 VIEWS: CPT

## 2025-02-13 RX ORDER — 0.9 % SODIUM CHLORIDE 0.9 %
1000 INTRAVENOUS SOLUTION INTRAVENOUS ONCE
Status: DISCONTINUED | OUTPATIENT
Start: 2025-02-13 | End: 2025-02-13 | Stop reason: HOSPADM

## 2025-02-13 RX ORDER — DIPHENHYDRAMINE HYDROCHLORIDE 50 MG/ML
25 INJECTION INTRAMUSCULAR; INTRAVENOUS ONCE
Status: DISCONTINUED | OUTPATIENT
Start: 2025-02-13 | End: 2025-02-13 | Stop reason: HOSPADM

## 2025-02-13 RX ORDER — ACETAMINOPHEN 500 MG
1000 TABLET ORAL ONCE
Status: DISCONTINUED | OUTPATIENT
Start: 2025-02-13 | End: 2025-02-13 | Stop reason: HOSPADM

## 2025-02-13 RX ORDER — PROCHLORPERAZINE EDISYLATE 5 MG/ML
10 INJECTION INTRAMUSCULAR; INTRAVENOUS ONCE
Status: DISCONTINUED | OUTPATIENT
Start: 2025-02-13 | End: 2025-02-13 | Stop reason: HOSPADM

## 2025-02-13 ASSESSMENT — LIFESTYLE VARIABLES
HOW MANY STANDARD DRINKS CONTAINING ALCOHOL DO YOU HAVE ON A TYPICAL DAY: PATIENT DOES NOT DRINK
HOW OFTEN DO YOU HAVE A DRINK CONTAINING ALCOHOL: NEVER

## 2025-02-13 NOTE — ED PROVIDER NOTES
cardiac rate, rhythm, and QT interval. CBC was ordered as part of my assessment for possible infection, anemia or thrombocytopenia. CMP to assess electrolytes, kidney function, liver function or any metabolic derangements. Troponin as a marker for myocardial ischemia or heart strain. Chest x-ray for any possible signs of, but without limitation to, pneumonia, pleural effusions, cardiomegaly, pneumothorax, atelectasis, rib or sternal abnormalities including fractures..BNP to evaluate for heart failure and/or as a marker for heart strain.       Patient administered .  Medications   sodium chloride 0.9 % bolus 1,000 mL (has no administration in time range)   prochlorperazine (COMPAZINE) injection 10 mg (has no administration in time range)   diphenhydrAMINE (BENADRYL) injection 25 mg (has no administration in time range)   acetaminophen (TYLENOL) tablet 1,000 mg (has no administration in time range)     54-year-old female presents with complaints of generalized bodyaches, chills, cough starting at home.  On arrival patient is tachycardic, afebrile, hemodynamically stable.  On examination she is nontoxic.  Lungs are clear and auscultation.  Abdomen is soft and nontender.  No meningeal signs.  Moving all 4 extremities.  On repeat vitals examination, patient is febrile so Tylenol was ordered.  Compazine, Benadryl and IV fluids was ordered for headaches.  Patient underwent chest x-ray, no evidence of pneumonia or focal process.  Patient positive for COVID-19.  Patient was awaiting further labs however did not want to get evaluated in the emergency room and wanted to go home.  Patient left AGAINST MEDICAL ADVICE.      Please see ED course for more details:         Discussion With Other Professionals:  Consultation with None .      1. COVID-19    2. Viral illness    3. Left against medical advice        Unfortunately, Maki Vargas at 4:41 PM decided to leave the Emergency Department Against Medical Advice.   Maki COLMENARES

## 2025-02-13 NOTE — ED NOTES
Pt signed out AMA. She verbalized her understanding of the risks up to and including death. Pt exited the ER with a steady gait.

## 2025-03-06 ENCOUNTER — TRANSCRIBE ORDERS (OUTPATIENT)
Dept: ADMINISTRATIVE | Age: 55
End: 2025-03-06

## 2025-03-06 DIAGNOSIS — Z12.31 ENCOUNTER FOR SCREENING MAMMOGRAM FOR MALIGNANT NEOPLASM OF BREAST: Primary | ICD-10-CM

## 2025-03-10 ENCOUNTER — HOSPITAL ENCOUNTER (OUTPATIENT)
Age: 55
Discharge: HOME | End: 2025-03-10
Payer: COMMERCIAL

## 2025-03-10 DIAGNOSIS — E55.9 VITAMIN D DEFICIENCY: ICD-10-CM

## 2025-03-10 DIAGNOSIS — E53.8 VITAMIN B12 DEFICIENCY: ICD-10-CM

## 2025-03-10 LAB
25(OH)D3 SERPL-MCNC: 22.7 NG/ML (ref 30–100)
FOLATE SERPL-MCNC: 4.6 NG/ML (ref 4.8–24.2)
VIT B12 SERPL-MCNC: 313 PG/ML (ref 211–946)

## 2025-03-10 PROCEDURE — 82746 ASSAY OF FOLIC ACID SERUM: CPT

## 2025-03-10 PROCEDURE — 82306 VITAMIN D 25 HYDROXY: CPT

## 2025-03-10 PROCEDURE — 82607 VITAMIN B-12: CPT

## 2025-03-10 PROCEDURE — 36415 COLL VENOUS BLD VENIPUNCTURE: CPT

## 2025-03-13 ENCOUNTER — OFFICE VISIT (OUTPATIENT)
Dept: ENDOCRINOLOGY | Age: 55
End: 2025-03-13
Payer: COMMERCIAL

## 2025-03-13 VITALS
WEIGHT: 259 LBS | SYSTOLIC BLOOD PRESSURE: 108 MMHG | DIASTOLIC BLOOD PRESSURE: 75 MMHG | BODY MASS INDEX: 38.36 KG/M2 | OXYGEN SATURATION: 98 % | RESPIRATION RATE: 18 BRPM | HEIGHT: 69 IN | HEART RATE: 96 BPM

## 2025-03-13 DIAGNOSIS — E78.2 MIXED HYPERLIPIDEMIA: ICD-10-CM

## 2025-03-13 DIAGNOSIS — E55.9 VITAMIN D DEFICIENCY: ICD-10-CM

## 2025-03-13 DIAGNOSIS — E03.9 HYPOTHYROIDISM, UNSPECIFIED TYPE: Primary | ICD-10-CM

## 2025-03-13 PROCEDURE — G2211 COMPLEX E/M VISIT ADD ON: HCPCS | Performed by: INTERNAL MEDICINE

## 2025-03-13 PROCEDURE — G8417 CALC BMI ABV UP PARAM F/U: HCPCS | Performed by: INTERNAL MEDICINE

## 2025-03-13 PROCEDURE — 3017F COLORECTAL CA SCREEN DOC REV: CPT | Performed by: INTERNAL MEDICINE

## 2025-03-13 PROCEDURE — 99214 OFFICE O/P EST MOD 30 MIN: CPT | Performed by: INTERNAL MEDICINE

## 2025-03-13 PROCEDURE — G8427 DOCREV CUR MEDS BY ELIG CLIN: HCPCS | Performed by: INTERNAL MEDICINE

## 2025-03-13 PROCEDURE — 4004F PT TOBACCO SCREEN RCVD TLK: CPT | Performed by: INTERNAL MEDICINE

## 2025-03-13 RX ORDER — LEVOTHYROXINE SODIUM 112 UG/1
112 TABLET ORAL DAILY
Qty: 30 TABLET | Refills: 11
Start: 2025-03-13

## 2025-03-13 NOTE — PROGRESS NOTES
[facility] [employer] [facility] Department of Endocrinology Diabetes and Metabolism   [address] [place] [id number]   Phone: [phone]  Fax: [phone]    Date of Service: 3/13/2025  Primary Care Physician: Roddy Timmons, APRN - CNP  Provider: Mohsen Vivar MD        Reason for the visit:  Primary Hypothyroidism    History of Present Illness:  The history is provided by the patient. No  was used. Accuracy of the patient data is excellent.         Maki Vargas is a very pleasant 54 y.o. female seen today for management of hypothyroidism     The patient was diagnosed with hypothyroidism 15 years ago  and since then has been on thyroid hormones replacement.     The patient is currently on Levothyroxine 112 mcg daily. Patient takes levothyroxine in the morning at empty stomach, wait one hour before eating , avoid multivitamins containing calcium  or iron with it.    The patient's dose was recently adjusted by her PCP to 112 mcg daily     Lab Results   Component Value Date/Time    TSH 9.14 (H) 07/07/2024 02:47 PM    T4FREE 1.0 07/07/2024 02:47 PM    FT3 1.68 (L) 07/07/2024 02:47 PM    T8LULNP 82.96 09/28/2022 08:39 AM    TPOABS 475.8 (H) 11/05/2018 12:04 PM       PAST MEDICAL HISTORY   Past Medical History:   Diagnosis Date    Abnormal Pap smear of cervix     ALT (SGPT) level raised     Anemia     Anxiety     Arthritis     back    Bronchitis     Complication of anesthesia     Depressive disorder, not elsewhere classified     Dietary surveillance and counseling     History of conization of cervix 3 times in [place]    Hx of conization of cervix complicating pregnancy 1990's    [place]    Hyperlipidemia     Hypothyroidism     Leukocytosis, unspecified     Memory disorder     Nonspecific elevation of levels of transaminase or lactic acid dehydrogenase (LDH)     Other abnormal blood chemistry     Overactive bladder     Pneumonia     Sleep difficulties     Tremor

## 2025-03-19 ENCOUNTER — HOSPITAL ENCOUNTER (OUTPATIENT)
Age: 55
Discharge: HOME | End: 2025-03-19
Payer: COMMERCIAL

## 2025-03-19 DIAGNOSIS — E03.9 HYPOTHYROIDISM, UNSPECIFIED TYPE: ICD-10-CM

## 2025-03-19 LAB
T4 FREE SERPL-MCNC: 1 NG/DL (ref 0.9–1.7)
TSH SERPL DL<=0.05 MIU/L-ACNC: 6.8 UIU/ML (ref 0.27–4.2)

## 2025-03-19 PROCEDURE — 84443 ASSAY THYROID STIM HORMONE: CPT

## 2025-03-19 PROCEDURE — 84439 ASSAY OF FREE THYROXINE: CPT

## 2025-03-19 PROCEDURE — 36415 COLL VENOUS BLD VENIPUNCTURE: CPT

## 2025-03-23 ENCOUNTER — RESULTS FOLLOW-UP (OUTPATIENT)
Dept: ENDOCRINOLOGY | Age: 55
End: 2025-03-23

## 2025-03-23 DIAGNOSIS — E03.9 HYPOTHYROIDISM, UNSPECIFIED TYPE: Primary | ICD-10-CM

## 2025-03-24 NOTE — TELEPHONE ENCOUNTER
Notify patient  Thyroid hormones are below goal.  Please confirm she is currently taking levothyroxine 112 mcg 1 tablet daily.  If so, I wanted to change the dose to 125 mcg daily and recheck level in 6 to 8 weeks.  After confirming the dose, please send a new prescription to her pharmacy and put the order for the TSH and free T4 to be done in 6 to 8 weeks

## 2025-03-27 ENCOUNTER — RESULTS FOLLOW-UP (OUTPATIENT)
Age: 55
End: 2025-03-27

## 2025-03-28 DIAGNOSIS — E03.9 HYPOTHYROIDISM, UNSPECIFIED TYPE: Primary | ICD-10-CM

## 2025-03-28 RX ORDER — LEVOTHYROXINE SODIUM 125 UG/1
125 TABLET ORAL DAILY
Qty: 90 TABLET | Refills: 1 | Status: SHIPPED | OUTPATIENT
Start: 2025-03-28

## 2025-03-28 NOTE — TELEPHONE ENCOUNTER
The pt did confirm 112mcg. She was notified of the dose increase and when labs are due. The orders were put in and the rx was pended to  provider.